# Patient Record
Sex: FEMALE | Race: ASIAN | NOT HISPANIC OR LATINO | URBAN - METROPOLITAN AREA
[De-identification: names, ages, dates, MRNs, and addresses within clinical notes are randomized per-mention and may not be internally consistent; named-entity substitution may affect disease eponyms.]

---

## 2022-04-05 ENCOUNTER — EMERGENCY (EMERGENCY)
Facility: HOSPITAL | Age: 42
LOS: 1 days | Discharge: HOME | End: 2022-04-05
Attending: EMERGENCY MEDICINE | Admitting: EMERGENCY MEDICINE
Payer: MEDICAID

## 2022-04-05 VITALS
SYSTOLIC BLOOD PRESSURE: 122 MMHG | RESPIRATION RATE: 17 BRPM | DIASTOLIC BLOOD PRESSURE: 73 MMHG | OXYGEN SATURATION: 100 % | HEART RATE: 94 BPM | TEMPERATURE: 98 F | WEIGHT: 260.15 LBS

## 2022-04-05 DIAGNOSIS — E11.9 TYPE 2 DIABETES MELLITUS WITHOUT COMPLICATIONS: ICD-10-CM

## 2022-04-05 DIAGNOSIS — E78.5 HYPERLIPIDEMIA, UNSPECIFIED: ICD-10-CM

## 2022-04-05 DIAGNOSIS — I10 ESSENTIAL (PRIMARY) HYPERTENSION: ICD-10-CM

## 2022-04-05 DIAGNOSIS — E66.01 MORBID (SEVERE) OBESITY DUE TO EXCESS CALORIES: ICD-10-CM

## 2022-04-05 LAB
A1C WITH ESTIMATED AVERAGE GLUCOSE RESULT: 11.4 % — HIGH (ref 4–5.6)
ALBUMIN SERPL ELPH-MCNC: 4 G/DL — SIGNIFICANT CHANGE UP (ref 3.3–5)
ALP SERPL-CCNC: 81 U/L — SIGNIFICANT CHANGE UP (ref 40–120)
ALT FLD-CCNC: 6 U/L — SIGNIFICANT CHANGE UP (ref 4–33)
ANION GAP SERPL CALC-SCNC: 14 MMOL/L — SIGNIFICANT CHANGE UP (ref 7–14)
ANISOCYTOSIS BLD QL: SLIGHT — SIGNIFICANT CHANGE UP
APPEARANCE UR: CLEAR — SIGNIFICANT CHANGE UP
AST SERPL-CCNC: 11 U/L — SIGNIFICANT CHANGE UP (ref 4–32)
B-OH-BUTYR SERPL-SCNC: 0.6 MMOL/L — HIGH (ref 0–0.4)
BACTERIA # UR AUTO: ABNORMAL
BASE EXCESS BLDV CALC-SCNC: -1.6 MMOL/L — SIGNIFICANT CHANGE UP (ref -2–3)
BASOPHILS # BLD AUTO: 0.05 K/UL — SIGNIFICANT CHANGE UP (ref 0–0.2)
BASOPHILS NFR BLD AUTO: 0.4 % — SIGNIFICANT CHANGE UP (ref 0–2)
BILIRUB SERPL-MCNC: 0.6 MG/DL — SIGNIFICANT CHANGE UP (ref 0.2–1.2)
BILIRUB UR-MCNC: NEGATIVE — SIGNIFICANT CHANGE UP
BLOOD GAS VENOUS COMPREHENSIVE RESULT: SIGNIFICANT CHANGE UP
BUN SERPL-MCNC: 7 MG/DL — SIGNIFICANT CHANGE UP (ref 7–23)
CALCIUM SERPL-MCNC: 9 MG/DL — SIGNIFICANT CHANGE UP (ref 8.4–10.5)
CHLORIDE BLDV-SCNC: 102 MMOL/L — SIGNIFICANT CHANGE UP (ref 96–108)
CHLORIDE SERPL-SCNC: 100 MMOL/L — SIGNIFICANT CHANGE UP (ref 98–107)
CO2 BLDV-SCNC: 26.2 MMOL/L — HIGH (ref 22–26)
CO2 SERPL-SCNC: 22 MMOL/L — SIGNIFICANT CHANGE UP (ref 22–31)
COLOR SPEC: SIGNIFICANT CHANGE UP
CREAT SERPL-MCNC: 0.52 MG/DL — SIGNIFICANT CHANGE UP (ref 0.5–1.3)
DACRYOCYTES BLD QL SMEAR: SLIGHT — SIGNIFICANT CHANGE UP
DIFF PNL FLD: ABNORMAL
EGFR: 119 ML/MIN/1.73M2 — SIGNIFICANT CHANGE UP
ELLIPTOCYTES BLD QL SMEAR: SLIGHT — SIGNIFICANT CHANGE UP
EOSINOPHIL # BLD AUTO: 0.38 K/UL — SIGNIFICANT CHANGE UP (ref 0–0.5)
EOSINOPHIL NFR BLD AUTO: 3.3 % — SIGNIFICANT CHANGE UP (ref 0–6)
EPI CELLS # UR: 4 /HPF — SIGNIFICANT CHANGE UP (ref 0–5)
ESTIMATED AVERAGE GLUCOSE: 280 — SIGNIFICANT CHANGE UP
FLUAV AG NPH QL: SIGNIFICANT CHANGE UP
FLUBV AG NPH QL: SIGNIFICANT CHANGE UP
GAS PNL BLDV: 133 MMOL/L — LOW (ref 136–145)
GLUCOSE BLDV-MCNC: 384 MG/DL — HIGH (ref 70–99)
GLUCOSE SERPL-MCNC: 359 MG/DL — HIGH (ref 70–99)
GLUCOSE UR QL: ABNORMAL
HCG SERPL-ACNC: <5 MIU/ML — SIGNIFICANT CHANGE UP
HCO3 BLDV-SCNC: 25 MMOL/L — SIGNIFICANT CHANGE UP (ref 22–29)
HCT VFR BLD CALC: 28.5 % — LOW (ref 34.5–45)
HCT VFR BLDA CALC: 23 % — LOW (ref 34.5–46.5)
HGB BLD CALC-MCNC: 7.5 G/DL — LOW (ref 11.5–15.5)
HGB BLD-MCNC: 7.4 G/DL — LOW (ref 11.5–15.5)
HYALINE CASTS # UR AUTO: 0 /LPF — SIGNIFICANT CHANGE UP (ref 0–7)
HYPOCHROMIA BLD QL: SIGNIFICANT CHANGE UP
IANC: 7.47 K/UL — HIGH (ref 1.8–7.4)
IMM GRANULOCYTES NFR BLD AUTO: 0.5 % — SIGNIFICANT CHANGE UP (ref 0–1.5)
KETONES UR-MCNC: ABNORMAL
LACTATE BLDV-MCNC: 2.1 MMOL/L — HIGH (ref 0.5–2)
LEUKOCYTE ESTERASE UR-ACNC: ABNORMAL
LYMPHOCYTES # BLD AUTO: 2.67 K/UL — SIGNIFICANT CHANGE UP (ref 1–3.3)
LYMPHOCYTES # BLD AUTO: 23.2 % — SIGNIFICANT CHANGE UP (ref 13–44)
MAGNESIUM SERPL-MCNC: 1.9 MG/DL — SIGNIFICANT CHANGE UP (ref 1.6–2.6)
MANUAL SMEAR VERIFICATION: SIGNIFICANT CHANGE UP
MCHC RBC-ENTMCNC: 15 PG — LOW (ref 27–34)
MCHC RBC-ENTMCNC: 26 GM/DL — LOW (ref 32–36)
MCV RBC AUTO: 57.7 FL — LOW (ref 80–100)
MICROCYTES BLD QL: SIGNIFICANT CHANGE UP
MONOCYTES # BLD AUTO: 0.88 K/UL — SIGNIFICANT CHANGE UP (ref 0–0.9)
MONOCYTES NFR BLD AUTO: 7.6 % — SIGNIFICANT CHANGE UP (ref 2–14)
NEUTROPHILS # BLD AUTO: 7.47 K/UL — HIGH (ref 1.8–7.4)
NEUTROPHILS NFR BLD AUTO: 65 % — SIGNIFICANT CHANGE UP (ref 43–77)
NITRITE UR-MCNC: NEGATIVE — SIGNIFICANT CHANGE UP
NRBC # BLD: 0 /100 WBCS — SIGNIFICANT CHANGE UP
NRBC # FLD: 0.02 K/UL — HIGH
PCO2 BLDV: 49 MMHG — HIGH (ref 39–42)
PH BLDV: 7.31 — LOW (ref 7.32–7.43)
PH UR: 6 — SIGNIFICANT CHANGE UP (ref 5–8)
PHOSPHATE SERPL-MCNC: 3.2 MG/DL — SIGNIFICANT CHANGE UP (ref 2.5–4.5)
PLAT MORPH BLD: NORMAL — SIGNIFICANT CHANGE UP
PLATELET # BLD AUTO: 385 K/UL — SIGNIFICANT CHANGE UP (ref 150–400)
PLATELET COUNT - ESTIMATE: NORMAL — SIGNIFICANT CHANGE UP
PO2 BLDV: 35 MMHG — SIGNIFICANT CHANGE UP
POIKILOCYTOSIS BLD QL AUTO: SIGNIFICANT CHANGE UP
POLYCHROMASIA BLD QL SMEAR: SLIGHT — SIGNIFICANT CHANGE UP
POTASSIUM BLDV-SCNC: 4.2 MMOL/L — SIGNIFICANT CHANGE UP (ref 3.5–5.1)
POTASSIUM SERPL-MCNC: 4.2 MMOL/L — SIGNIFICANT CHANGE UP (ref 3.5–5.3)
POTASSIUM SERPL-SCNC: 4.2 MMOL/L — SIGNIFICANT CHANGE UP (ref 3.5–5.3)
PROT SERPL-MCNC: 7.6 G/DL — SIGNIFICANT CHANGE UP (ref 6–8.3)
PROT UR-MCNC: ABNORMAL
RBC # BLD: 4.94 M/UL — SIGNIFICANT CHANGE UP (ref 3.8–5.2)
RBC # FLD: 24.4 % — HIGH (ref 10.3–14.5)
RBC BLD AUTO: ABNORMAL
RBC CASTS # UR COMP ASSIST: 5 /HPF — HIGH (ref 0–4)
RSV RNA NPH QL NAA+NON-PROBE: SIGNIFICANT CHANGE UP
SAO2 % BLDV: 53.3 % — SIGNIFICANT CHANGE UP
SARS-COV-2 RNA SPEC QL NAA+PROBE: SIGNIFICANT CHANGE UP
SCHISTOCYTES BLD QL AUTO: SLIGHT — SIGNIFICANT CHANGE UP
SODIUM SERPL-SCNC: 136 MMOL/L — SIGNIFICANT CHANGE UP (ref 135–145)
SP GR SPEC: 1.03 — SIGNIFICANT CHANGE UP (ref 1–1.05)
TARGETS BLD QL SMEAR: SLIGHT — SIGNIFICANT CHANGE UP
TSH SERPL-MCNC: 3.15 UIU/ML — SIGNIFICANT CHANGE UP (ref 0.27–4.2)
UROBILINOGEN FLD QL: SIGNIFICANT CHANGE UP
WBC # BLD: 11.51 K/UL — HIGH (ref 3.8–10.5)
WBC # FLD AUTO: 11.51 K/UL — HIGH (ref 3.8–10.5)
WBC UR QL: 15 /HPF — HIGH (ref 0–5)

## 2022-04-05 PROCEDURE — 99205 OFFICE O/P NEW HI 60 MIN: CPT | Mod: GC

## 2022-04-05 PROCEDURE — 99218: CPT

## 2022-04-05 RX ORDER — DEXTROSE 50 % IN WATER 50 %
12.5 SYRINGE (ML) INTRAVENOUS ONCE
Refills: 0 | Status: DISCONTINUED | OUTPATIENT
Start: 2022-04-05 | End: 2022-04-08

## 2022-04-05 RX ORDER — INSULIN LISPRO 100/ML
7 VIAL (ML) SUBCUTANEOUS
Refills: 0 | Status: DISCONTINUED | OUTPATIENT
Start: 2022-04-05 | End: 2022-04-05

## 2022-04-05 RX ORDER — CEFTRIAXONE 500 MG/1
1000 INJECTION, POWDER, FOR SOLUTION INTRAMUSCULAR; INTRAVENOUS ONCE
Refills: 0 | Status: COMPLETED | OUTPATIENT
Start: 2022-04-05 | End: 2022-04-05

## 2022-04-05 RX ORDER — DEXTROSE 50 % IN WATER 50 %
25 SYRINGE (ML) INTRAVENOUS ONCE
Refills: 0 | Status: DISCONTINUED | OUTPATIENT
Start: 2022-04-05 | End: 2022-04-08

## 2022-04-05 RX ORDER — GLUCAGON INJECTION, SOLUTION 0.5 MG/.1ML
1 INJECTION, SOLUTION SUBCUTANEOUS ONCE
Refills: 0 | Status: DISCONTINUED | OUTPATIENT
Start: 2022-04-05 | End: 2022-04-08

## 2022-04-05 RX ORDER — SODIUM CHLORIDE 9 MG/ML
1000 INJECTION INTRAMUSCULAR; INTRAVENOUS; SUBCUTANEOUS ONCE
Refills: 0 | Status: COMPLETED | OUTPATIENT
Start: 2022-04-05 | End: 2022-04-05

## 2022-04-05 RX ORDER — SODIUM CHLORIDE 9 MG/ML
1000 INJECTION, SOLUTION INTRAVENOUS
Refills: 0 | Status: DISCONTINUED | OUTPATIENT
Start: 2022-04-05 | End: 2022-04-08

## 2022-04-05 RX ORDER — DEXTROSE 50 % IN WATER 50 %
15 SYRINGE (ML) INTRAVENOUS ONCE
Refills: 0 | Status: DISCONTINUED | OUTPATIENT
Start: 2022-04-05 | End: 2022-04-08

## 2022-04-05 RX ORDER — INSULIN LISPRO 100/ML
VIAL (ML) SUBCUTANEOUS
Refills: 0 | Status: DISCONTINUED | OUTPATIENT
Start: 2022-04-05 | End: 2022-04-08

## 2022-04-05 RX ORDER — SODIUM CHLORIDE 9 MG/ML
1000 INJECTION, SOLUTION INTRAVENOUS ONCE
Refills: 0 | Status: DISCONTINUED | OUTPATIENT
Start: 2022-04-05 | End: 2022-04-05

## 2022-04-05 RX ORDER — INSULIN LISPRO 100/ML
VIAL (ML) SUBCUTANEOUS AT BEDTIME
Refills: 0 | Status: DISCONTINUED | OUTPATIENT
Start: 2022-04-05 | End: 2022-04-08

## 2022-04-05 RX ORDER — INSULIN GLARGINE 100 [IU]/ML
24 INJECTION, SOLUTION SUBCUTANEOUS AT BEDTIME
Refills: 0 | Status: DISCONTINUED | OUTPATIENT
Start: 2022-04-05 | End: 2022-04-08

## 2022-04-05 RX ORDER — INSULIN LISPRO 100/ML
8 VIAL (ML) SUBCUTANEOUS
Refills: 0 | Status: DISCONTINUED | OUTPATIENT
Start: 2022-04-05 | End: 2022-04-08

## 2022-04-05 RX ORDER — INSULIN GLARGINE 100 [IU]/ML
23 INJECTION, SOLUTION SUBCUTANEOUS AT BEDTIME
Refills: 0 | Status: DISCONTINUED | OUTPATIENT
Start: 2022-04-05 | End: 2022-04-05

## 2022-04-05 RX ADMIN — SODIUM CHLORIDE 125 MILLILITER(S): 9 INJECTION, SOLUTION INTRAVENOUS at 18:34

## 2022-04-05 RX ADMIN — CEFTRIAXONE 100 MILLIGRAM(S): 500 INJECTION, POWDER, FOR SOLUTION INTRAMUSCULAR; INTRAVENOUS at 14:47

## 2022-04-05 RX ADMIN — SODIUM CHLORIDE 1000 MILLILITER(S): 9 INJECTION INTRAMUSCULAR; INTRAVENOUS; SUBCUTANEOUS at 14:47

## 2022-04-05 RX ADMIN — INSULIN GLARGINE 24 UNIT(S): 100 INJECTION, SOLUTION SUBCUTANEOUS at 22:00

## 2022-04-05 RX ADMIN — Medication 2: at 17:35

## 2022-04-05 RX ADMIN — Medication 1: at 22:00

## 2022-04-05 RX ADMIN — Medication 8 UNIT(S): at 17:35

## 2022-04-05 RX ADMIN — SODIUM CHLORIDE 1000 MILLILITER(S): 9 INJECTION INTRAMUSCULAR; INTRAVENOUS; SUBCUTANEOUS at 12:19

## 2022-04-05 NOTE — ED PROVIDER NOTE - PROGRESS NOTE DETAILS
pt states she is not bleeding now, hx of low hemoglobin from DUB.  recommend CDU for endo pt willing to stay. obtain a weight for insulin sliding scale

## 2022-04-05 NOTE — ED CDU PROVIDER INITIAL DAY NOTE - PROGRESS NOTE DETAILS
cdu scot matt: pt resting comfortably in bed, getting insulin, fluids, will monitor FS's, and reasses

## 2022-04-05 NOTE — ED CDU PROVIDER INITIAL DAY NOTE - ATTENDING CONTRIBUTION TO CARE
Attending Statement: I have reviewed and agree with all pertinent clinical information, including history and physical exam and agree with treatment plan of the PA, except as noted.  42yoF denies sig pmhx pw lethargy, fatigue and dizziness x one week. no fever/chills. no headache no focal weakness or numbness no cp./sob no n/v/d no abdominal pain. no trauma. no hx of DM   Vital signs noted. obese female sitting up, nontoxic. EOMI no facial asymmetry no slurred speech normal S1-S2 No resp distress. able to speak in full and clear sentences. no wheeze, rales or stridor. AOx3, no focal deficits. CN 2-12 grossly intact. nl gait. no meningeal signs.   plan endocrine consult, labs, and monitor

## 2022-04-05 NOTE — CONSULT NOTE ADULT - ASSESSMENT
41 y/o F w/ hx of anemia (on daily iron tablets - never required transfusion) p/w 1 week of dizziness ( described as the room spinning), nausea, increased thirst, lethargy and urinary frequency, found to have new onset T2DM    #New onset T2DM  A1c 11.4%. Goal < 7%  Inpatient FS goal 140-180  Home regimen: None (new onset)  Recs:   -Lantus _  qHS  -Admelog _ qAC. HOLD if NPO  -low dose correctional scale qAC and qHS  -consistent carb diet  -FS qAC and qHS  -RD consult and insulin pen teaching (ordered)   For d/c:   -basal/bolus vs. basal + orals  -Can fu with     #HTN  BP Goal < 130/80  Continue management per primary team    #HLD  LDL goal < 70  Statin if no contraindications    Libby Soliz MD  Endocrine Fellow  Can be reached via teams. For follow up questions, discharge recommendations, or new consults, please call answering service at [phone] (weekdays); [phone] (nights/weekends) 41 y/o F w/ hx of anemia (on daily iron tablets - never required transfusion) p/w 1 week of dizziness ( described as the room spinning), nausea, increased thirst, lethargy and urinary frequency, found to have new onset T2DM    #New onset T2DM  A1c 11.4%. Goal < 7%  Inpatient FS goal 140-180  Home regimen: None (new onset)  BHB 0.6, mild. No gap or acidosis  Start 0.4 u/kg weight based dosing  Recs:   -Start Lantus 24 units qHS  -Start Admelog 8 units qAC. HOLD if NPO  -low dose correctional scale qAC and qHS  -consistent carb diet  -FS qAC and qHS  -RD consult and insulin pen teaching (ordered)   -Discussed that patient should avoid pregnancy with uncontrolled A1c  For d/c:   -Lantus (likely 24 units) + Metformin 500 mg BID with meals (inc to 1000 mg BID with meals 1 week from discharge)  -For coverage purpose, please send Lantus/Basaglar/Tresiba/Semglee/Toujeo 10 units at bedtime. Can start by sending one of each and discuss with pharmacy which is covered.   -Please d/c with supplies: insulin pens, pen needles, glucometer, test strips, lancets, and alcohol pads   -Can fu with Endocrine Practice at [address] [place] [address], [address], [place] [address]; Ph # [phone]  -Will need ophthalmology follow up.     #HTN  BP Goal < 130/80  Continue management per primary team    #HLD  LDL goal < 70  Patient is of child bearing age. Can check lipid panel outpatient and decide on statin initiation.     Libby Soliz MD  Endocrine Fellow  Can be reached via teams. For follow up questions, discharge recommendations, or new consults, please call answering service at [phone] (weekdays); [phone] (nights/weekends)

## 2022-04-05 NOTE — ED CDU PROVIDER INITIAL DAY NOTE - OBJECTIVE STATEMENT
43 y/o female pmh obesity c/o dizziness, weakness, polyuria and polydipsia x 1 week. Pt admits to not feeling well this week and it became worse today. Pt was found to have FS of 367. Labs revealed a1c of 11.4 and pt was sent to the CDU for glycemic control and endocrine consult. pt denies chest pain, sob, abd pain, v/d, numbness, tingling, syncope, fever or chills.

## 2022-04-05 NOTE — ED PROVIDER NOTE - CHIEF COMPLAINT
Problem: RESPIRATORY - ADULT  Goal: Achieves optimal ventilation and oxygenation  INTERVENTIONS:  - Assess for changes in respiratory status  - Assess for changes in mentation and behavior  - Position to facilitate oxygenation and minimize respiratory effo The patient is a 42y Female complaining of

## 2022-04-05 NOTE — ED PROVIDER NOTE - NS ED ROS FT
CONSTITUTIONAL - No fever, No diaphoresis, No weight change  EYES - No eye pain, No blurred vision  ENT - No change in hearing, No sore throat, No neck pain, No rhinorrhea, No ear pain  RESPIRATORY - No shortness of breath, No cough  CARDIAC -No chest pain, No palpitations  GI - No abdominal pain, +nausea, No vomiting, No diarrhea, No constipation  - No dysuria, +frequency, no hematuria.   MUSCULOSKELETAL - No joint pain, No swelling, No back pain  NEUROLOGIC - No numbness, +gen lethargy, No headache, +dizziness

## 2022-04-05 NOTE — CONSULT NOTE ADULT - SUBJECTIVE AND OBJECTIVE BOX
HPI:  41 y/o F w/ hx of anemia (on daily iron tablets - never required transfusion) p/w 1 week of dizziness ( described as the room spinning), nausea, increased thirst, lethargy and urinary frequency.    A1c found to be 11.4%. . BHB mildly positive 0.6, with AG 14 and Bicarb 22. Consulted for new onset DM.     PAST MEDICAL & SURGICAL HISTORY:    FAMILY HISTORY:    Social History:    Home Medications:      MEDICATIONS  (STANDING):  dextrose 5%. 1000 milliLiter(s) (50 mL/Hr) IV Continuous <Continuous>  dextrose 5%. 1000 milliLiter(s) (100 mL/Hr) IV Continuous <Continuous>  dextrose 50% Injectable 25 Gram(s) IV Push once  dextrose 50% Injectable 12.5 Gram(s) IV Push once  dextrose 50% Injectable 25 Gram(s) IV Push once  glucagon  Injectable 1 milliGRAM(s) IntraMuscular once  insulin glargine Injectable (LANTUS) 23 Unit(s) SubCutaneous at bedtime  insulin lispro (ADMELOG) corrective regimen sliding scale   SubCutaneous three times a day before meals  insulin lispro (ADMELOG) corrective regimen sliding scale   SubCutaneous at bedtime  insulin lispro Injectable (ADMELOG) 7 Unit(s) SubCutaneous three times a day before meals  lactated ringers. 1000 milliLiter(s) (125 mL/Hr) IV Continuous <Continuous>    MEDICATIONS  (PRN):  dextrose Oral Gel 15 Gram(s) Oral once PRN Blood Glucose LESS THAN 70 milliGRAM(s)/deciliter      Allergies    No Known Allergies    Intolerances      Review of Systems:  Constitutional: No fever  Eyes: No blurry vision  Neuro: No tremors  HEENT: No pain  Cardiovascular: No chest pain, palpitations  Respiratory: No SOB, no cough  GI: No nausea, vomiting, abdominal pain  : No dysuria  Skin: no rash  Psych: no depression  Endocrine: no polyuria, polydipsia  Hem/lymph: no swelling  Osteoporosis: no fractures    PHYSICAL EXAM:  VITALS: T(C): 36.8 (04-05-22 @ 15:36)  T(F): 98.2 (04-05-22 @ 15:36), Max: 98.3 (04-05-22 @ 11:01)  HR: 86 (04-05-22 @ 15:36) (86 - 94)  BP: 127/81 (04-05-22 @ 15:36) (114/96 - 132/70)  RR:  (17 - 20)  SpO2:  (100% - 100%)  Wt(kg): --  GENERAL: NAD  EYES: No proptosis, no lid lag, anicteric  THYROID: Normal size, no palpable nodules  RESPIRATORY: Clear to auscultation bilaterally  CARDIOVASCULAR: Regular rate and rhythm  GI: Soft, nontender, non distended  EXT: b/l feet without wounds; 2+ pulses  PSYCH: Alert and oriented x 3, reactive mood    POCT Blood Glucose.: 311 mg/dL (04-05-22 @ 13:43)  POCT Blood Glucose.: 314 mg/dL (04-05-22 @ 12:37)  POCT Blood Glucose.: 367 mg/dL (04-05-22 @ 11:03)                            7.4    11.51 )-----------( 385      ( 05 Apr 2022 12:30 )             28.5       04-05    136  |  100  |  7   ----------------------------<  359<H>  4.2   |  22  |  0.52    EGFR if : x   EGFR if non : x     Ca    9.0      04-05  Mg     1.90     04-05  Phos  3.2     04-05    TPro  7.6  /  Alb  4.0  /  TBili  0.6  /  DBili  x   /  AST  11  /  ALT  6   /  AlkPhos  81  04-05      Thyroid Function Tests:  04-05 @ 12:30 TSH 3.15 FreeT4 -- T3 -- Anti TPO -- Anti Thyroglobulin Ab -- TSI --      A1C with Estimated Average Glucose Result: 11.4 % (04-05-22 @ 12:30)          Radiology:                HPI:  43 y/o F w/ hx of anemia (on daily iron tablets - never required transfusion) p/w 1 week of dizziness ( described as the room spinning), nausea, increased thirst, lethargy and urinary frequency.    A1c found to be 11.4%. . BHB mildly positive 0.6, with AG 14 and Bicarb 22. Consulted for new onset DM.   Patient does not take any medications at home. Diet is poor.     PAST MEDICAL & SURGICAL HISTORY: None    FAMILY HISTORY: DM in mother    Social History: No tobacco or alcohol use    Home Medications: iron tablets, ibuprofen for menstrual cramps      MEDICATIONS  (STANDING):  dextrose 5%. 1000 milliLiter(s) (50 mL/Hr) IV Continuous <Continuous>  dextrose 5%. 1000 milliLiter(s) (100 mL/Hr) IV Continuous <Continuous>  dextrose 50% Injectable 25 Gram(s) IV Push once  dextrose 50% Injectable 12.5 Gram(s) IV Push once  dextrose 50% Injectable 25 Gram(s) IV Push once  glucagon  Injectable 1 milliGRAM(s) IntraMuscular once  insulin glargine Injectable (LANTUS) 23 Unit(s) SubCutaneous at bedtime  insulin lispro (ADMELOG) corrective regimen sliding scale   SubCutaneous three times a day before meals  insulin lispro (ADMELOG) corrective regimen sliding scale   SubCutaneous at bedtime  insulin lispro Injectable (ADMELOG) 7 Unit(s) SubCutaneous three times a day before meals  lactated ringers. 1000 milliLiter(s) (125 mL/Hr) IV Continuous <Continuous>    MEDICATIONS  (PRN):  dextrose Oral Gel 15 Gram(s) Oral once PRN Blood Glucose LESS THAN 70 milliGRAM(s)/deciliter      Allergies    No Known Allergies    Intolerances      Review of Systems:  Constitutional: No fever  Eyes: No blurry vision  Neuro: No tremors  HEENT: No pain  Cardiovascular: No chest pain, palpitations  Respiratory: No SOB, no cough  GI: No nausea, vomiting, abdominal pain  : No dysuria  Skin: no rash  Psych: no depression  Endocrine: no polyuria, polydipsia  Hem/lymph: no swelling  Osteoporosis: no fractures    PHYSICAL EXAM:  VITALS: T(C): 36.8 (04-05-22 @ 15:36)  T(F): 98.2 (04-05-22 @ 15:36), Max: 98.3 (04-05-22 @ 11:01)  HR: 86 (04-05-22 @ 15:36) (86 - 94)  BP: 127/81 (04-05-22 @ 15:36) (114/96 - 132/70)  RR:  (17 - 20)  SpO2:  (100% - 100%)  Wt(kg): --  GENERAL: NAD, obese  EYES: No proptosis, no lid lag, anicteric  THYROID: Normal size, no palpable nodules  RESPIRATORY: Clear to auscultation bilaterally  CARDIOVASCULAR: Regular rate and rhythm  GI: Soft, nontender, non distended  EXT: b/l feet without wounds; 2+ pulses  PSYCH: Alert and oriented x 3, reactive mood    POCT Blood Glucose.: 311 mg/dL (04-05-22 @ 13:43)  POCT Blood Glucose.: 314 mg/dL (04-05-22 @ 12:37)  POCT Blood Glucose.: 367 mg/dL (04-05-22 @ 11:03)                            7.4    11.51 )-----------( 385      ( 05 Apr 2022 12:30 )             28.5       04-05    136  |  100  |  7   ----------------------------<  359<H>  4.2   |  22  |  0.52    EGFR if : x   EGFR if non : x     Ca    9.0      04-05  Mg     1.90     04-05  Phos  3.2     04-05    TPro  7.6  /  Alb  4.0  /  TBili  0.6  /  DBili  x   /  AST  11  /  ALT  6   /  AlkPhos  81  04-05      Thyroid Function Tests:  04-05 @ 12:30 TSH 3.15 FreeT4 -- T3 -- Anti TPO -- Anti Thyroglobulin Ab -- TSI --      A1C with Estimated Average Glucose Result: 11.4 % (04-05-22 @ 12:30)          Radiology:

## 2022-04-05 NOTE — CONSULT NOTE ADULT - ATTENDING COMMENTS
42F new onset DM2 HbA1c 11.4%  Patient used to go to gym and eat healthier, will resume these habits.  DC on basal insulin plus metformin. Insulin pen and glucometer teaching.  Discussed adding GLP1RA as outpatient. Working on setting appointments at [address] [place] [address] for follow up.  Endocrine team to finalize dosing for dc recs tomorrow.    Jose Galarza MD  Division of Endocrinology  Pager: [phone]    If after 6PM or before 9AM, or on weekends/holidays, please call endocrine answering service for assistance ([phone]).  For nonurgent matters email [email or web address] for assistance.

## 2022-04-05 NOTE — ED ADULT TRIAGE NOTE - CHIEF COMPLAINT QUOTE
pt c/o dizziness, increased thirst, lethargy and nausea x 1 week. pt found to be hyperglycemic in triage, denies PMH of DM.

## 2022-04-05 NOTE — ED ADULT NURSE NOTE - OBJECTIVE STATEMENT
pt A&ox4, came to ED for polyuria, dizziness, and increased thirst. pt states symptoms started 1 week ago. pt feels dizzy when walking and resting. pt denies difficulty urinating and states urine is clear.  pt denies Chest pain and SOB. NSR on telemetry. breathing is spontaneous and unlabored. sating 99% on RA. bilateral pedal and radial pulses palpable and strong. left 20g IV placed Labs drawn and sent as per ordered. Bed in lowest position, call bell within reach, all other safety and comfort measures provided. awaiting labs results and further orders.

## 2022-04-05 NOTE — ED PROVIDER NOTE - CLINICAL SUMMARY MEDICAL DECISION MAKING FREE TEXT BOX
O'Valery DO PGY-2: pt p/w 1 week of nausea, lethargy, urinary frequency increased thirst. DDx include but not limited to: new DM w/ hyperglycemia vs DKA vs UTI vs electrolyte abnormality. Ordered labs, UA/uclx, ekg, fluids. Will reassess. Dispo pending workup.

## 2022-04-05 NOTE — ED PROVIDER NOTE - ATTENDING CONTRIBUTION TO CARE
Attending Statement: I have personally seen and examined this patient. I have fully participated in the care of this patient. I have reviewed all pertinent clinical information, including history physical exam, plan and the Resident's note and agree except as noted  42yoF denies sig pmhx pw lethargy, fatigue and dizziness x one week. no fever/chills. no headache no focal weakness or numbness no cp./sob no n/v/d no abdominal pain. no trauma. no hx of DM   Vital signs noted. obese female sitting up, nontoxic. EOMI no facial asymmetry no slurred speech normal S1-S2 No resp distress. able to speak in full and clear sentences. no wheeze, rales or stridor. AOx3, no focal deficits. CN 2-12 grossly intact. nl gait. no meningeal signs.   plan ekg, labs, IVF, re assess

## 2022-04-05 NOTE — ED PROVIDER NOTE - PHYSICAL EXAMINATION
CONSTITUTIONAL: Well-developed; well-nourished; in no acute distress.   SKIN: warm, dry  HEAD: Normocephalic; atraumatic.  EYES: no conjunctival injection. PERRL.   ENT: No nasal discharge; airway clear. Mucus membranes dry   NECK: Supple; non tender.  CARD: S1, S2 normal; no murmurs, gallops, or rubs. Regular rate and rhythm.   RESP: No wheezes, rales or rhonchi. Good air movement bilaterally.   ABD: soft ntnd, no guarding, no distention, no rigidity.   EXT:  No cyanosis.   NEURO: Alert, oriented, grossly unremarkable. CN III-XII intact. Motor strength 5/5 in all extremities. Sensation intact throughout.   PSYCH: Cooperative, appropriate.

## 2022-04-05 NOTE — ED PROVIDER NOTE - OBJECTIVE STATEMENT
41 y/o F w/ no significant pmhx p/w 1 week of dizziness ( described as the room spinning), nausea, increased thirst, lethargy and urinary frequency. Denies recent f/c, vomiting, cp, sob, abd pain, constipation. 43 y/o F w/ hx of anemia (on daily iron tablets - never required transfusion) p/w 1 week of dizziness ( described as the room spinning), nausea, increased thirst, lethargy and urinary frequency. Denies recent f/c, vomiting, cp, sob, abd pain, constipation.

## 2022-04-06 VITALS
DIASTOLIC BLOOD PRESSURE: 76 MMHG | RESPIRATION RATE: 18 BRPM | HEART RATE: 90 BPM | SYSTOLIC BLOOD PRESSURE: 144 MMHG | TEMPERATURE: 98 F | OXYGEN SATURATION: 99 %

## 2022-04-06 LAB
ALBUMIN SERPL ELPH-MCNC: 3.4 G/DL — SIGNIFICANT CHANGE UP (ref 3.3–5)
ALP SERPL-CCNC: 71 U/L — SIGNIFICANT CHANGE UP (ref 40–120)
ALT FLD-CCNC: 5 U/L — SIGNIFICANT CHANGE UP (ref 4–33)
ANION GAP SERPL CALC-SCNC: 10 MMOL/L — SIGNIFICANT CHANGE UP (ref 7–14)
AST SERPL-CCNC: 9 U/L — SIGNIFICANT CHANGE UP (ref 4–32)
BASOPHILS # BLD AUTO: 0.04 K/UL — SIGNIFICANT CHANGE UP (ref 0–0.2)
BASOPHILS NFR BLD AUTO: 0.3 % — SIGNIFICANT CHANGE UP (ref 0–2)
BILIRUB SERPL-MCNC: 0.5 MG/DL — SIGNIFICANT CHANGE UP (ref 0.2–1.2)
BLOOD GAS VENOUS COMPREHENSIVE RESULT: SIGNIFICANT CHANGE UP
BUN SERPL-MCNC: 7 MG/DL — SIGNIFICANT CHANGE UP (ref 7–23)
CALCIUM SERPL-MCNC: 8.4 MG/DL — SIGNIFICANT CHANGE UP (ref 8.4–10.5)
CHLORIDE SERPL-SCNC: 101 MMOL/L — SIGNIFICANT CHANGE UP (ref 98–107)
CO2 SERPL-SCNC: 23 MMOL/L — SIGNIFICANT CHANGE UP (ref 22–31)
CREAT SERPL-MCNC: 0.49 MG/DL — LOW (ref 0.5–1.3)
CULTURE RESULTS: SIGNIFICANT CHANGE UP
EGFR: 121 ML/MIN/1.73M2 — SIGNIFICANT CHANGE UP
EOSINOPHIL # BLD AUTO: 0.45 K/UL — SIGNIFICANT CHANGE UP (ref 0–0.5)
EOSINOPHIL NFR BLD AUTO: 3.5 % — SIGNIFICANT CHANGE UP (ref 0–6)
GLUCOSE SERPL-MCNC: 259 MG/DL — HIGH (ref 70–99)
HCT VFR BLD CALC: 28 % — LOW (ref 34.5–45)
HGB BLD-MCNC: 7.1 G/DL — LOW (ref 11.5–15.5)
IANC: 8.79 K/UL — HIGH (ref 1.8–7.4)
IMM GRANULOCYTES NFR BLD AUTO: 0.3 % — SIGNIFICANT CHANGE UP (ref 0–1.5)
LYMPHOCYTES # BLD AUTO: 2.61 K/UL — SIGNIFICANT CHANGE UP (ref 1–3.3)
LYMPHOCYTES # BLD AUTO: 20.4 % — SIGNIFICANT CHANGE UP (ref 13–44)
MCHC RBC-ENTMCNC: 14.8 PG — LOW (ref 27–34)
MCHC RBC-ENTMCNC: 25.4 GM/DL — LOW (ref 32–36)
MCV RBC AUTO: 58.3 FL — LOW (ref 80–100)
MONOCYTES # BLD AUTO: 0.84 K/UL — SIGNIFICANT CHANGE UP (ref 0–0.9)
MONOCYTES NFR BLD AUTO: 6.6 % — SIGNIFICANT CHANGE UP (ref 2–14)
NEUTROPHILS # BLD AUTO: 8.79 K/UL — HIGH (ref 1.8–7.4)
NEUTROPHILS NFR BLD AUTO: 68.9 % — SIGNIFICANT CHANGE UP (ref 43–77)
NRBC # BLD: 0 /100 WBCS — SIGNIFICANT CHANGE UP
NRBC # FLD: 0.03 K/UL — HIGH
PLATELET # BLD AUTO: 363 K/UL — SIGNIFICANT CHANGE UP (ref 150–400)
POTASSIUM SERPL-MCNC: 3.9 MMOL/L — SIGNIFICANT CHANGE UP (ref 3.5–5.3)
POTASSIUM SERPL-SCNC: 3.9 MMOL/L — SIGNIFICANT CHANGE UP (ref 3.5–5.3)
PROT SERPL-MCNC: 6.6 G/DL — SIGNIFICANT CHANGE UP (ref 6–8.3)
RBC # BLD: 4.8 M/UL — SIGNIFICANT CHANGE UP (ref 3.8–5.2)
RBC # FLD: 24.5 % — HIGH (ref 10.3–14.5)
SODIUM SERPL-SCNC: 134 MMOL/L — LOW (ref 135–145)
SPECIMEN SOURCE: SIGNIFICANT CHANGE UP
WBC # BLD: 12.77 K/UL — HIGH (ref 3.8–10.5)
WBC # FLD AUTO: 12.77 K/UL — HIGH (ref 3.8–10.5)

## 2022-04-06 PROCEDURE — 99217: CPT | Mod: FS

## 2022-04-06 PROCEDURE — 99214 OFFICE O/P EST MOD 30 MIN: CPT

## 2022-04-06 RX ORDER — ISOPROPYL ALCOHOL, BENZOCAINE .7; .06 ML/ML; ML/ML
1 SWAB TOPICAL
Qty: 100 | Refills: 1
Start: 2022-04-06 | End: 2022-05-25

## 2022-04-06 RX ORDER — CEPHALEXIN 500 MG
1 CAPSULE ORAL
Qty: 28 | Refills: 0
Start: 2022-04-06 | End: 2022-04-12

## 2022-04-06 RX ORDER — ENOXAPARIN SODIUM 100 MG/ML
28 INJECTION SUBCUTANEOUS
Qty: 5 | Refills: 0
Start: 2022-04-06 | End: 2022-05-05

## 2022-04-06 RX ORDER — METFORMIN HYDROCHLORIDE 850 MG/1
1 TABLET ORAL
Qty: 100 | Refills: 0
Start: 2022-04-06 | End: 2022-05-25

## 2022-04-06 RX ORDER — INSULIN LISPRO 100/ML
10 VIAL (ML) SUBCUTANEOUS
Qty: 5 | Refills: 0
Start: 2022-04-06 | End: 2022-05-05

## 2022-04-06 RX ADMIN — Medication 3: at 07:27

## 2022-04-06 RX ADMIN — Medication 3: at 16:49

## 2022-04-06 RX ADMIN — Medication 8 UNIT(S): at 07:28

## 2022-04-06 RX ADMIN — Medication 8 UNIT(S): at 16:49

## 2022-04-06 RX ADMIN — Medication 5: at 12:13

## 2022-04-06 RX ADMIN — Medication 8 UNIT(S): at 12:14

## 2022-04-06 NOTE — ED CDU PROVIDER SUBSEQUENT DAY NOTE - NS ED ATTENDING STATEMENT MOD
This was a shared visit with the NARESH. I reviewed and verified the documentation and independently performed the documented:

## 2022-04-06 NOTE — ED CDU PROVIDER DISPOSITION NOTE - NS ED ATTENDING STATEMENT MOD
This was a shared visit with the NARESH. I reviewed and verified the documentation and independently performed the documented: Attending with

## 2022-04-06 NOTE — PROGRESS NOTE ADULT - SUBJECTIVE AND OBJECTIVE BOX
Chief Complaint/Follow-up on:   DM    Subjective:  pt feels well  we had a long discussion regarding her DM and need for DM control   her glucose remains elevated despite basal bolus insulin         MEDICATIONS  (STANDING):  dextrose 5%. 1000 milliLiter(s) (50 mL/Hr) IV Continuous <Continuous>  dextrose 5%. 1000 milliLiter(s) (100 mL/Hr) IV Continuous <Continuous>  dextrose 50% Injectable 25 Gram(s) IV Push once  dextrose 50% Injectable 12.5 Gram(s) IV Push once  dextrose 50% Injectable 25 Gram(s) IV Push once  glucagon  Injectable 1 milliGRAM(s) IntraMuscular once  insulin glargine Injectable (LANTUS) 24 Unit(s) SubCutaneous at bedtime  insulin lispro (ADMELOG) corrective regimen sliding scale   SubCutaneous three times a day before meals  insulin lispro (ADMELOG) corrective regimen sliding scale   SubCutaneous at bedtime  insulin lispro Injectable (ADMELOG) 8 Unit(s) SubCutaneous three times a day before meals  lactated ringers. 1000 milliLiter(s) (125 mL/Hr) IV Continuous <Continuous>    MEDICATIONS  (PRN):  dextrose Oral Gel 15 Gram(s) Oral once PRN Blood Glucose LESS THAN 70 milliGRAM(s)/deciliter      PHYSICAL EXAM:  VITALS: T(C): 37 (04-06-22 @ 13:58)  T(F): 98.6 (04-06-22 @ 13:58), Max: 98.7 (04-06-22 @ 02:05)  HR: 91 (04-06-22 @ 13:58) (78 - 92)  BP: 143/81 (04-06-22 @ 13:58) (119/69 - 143/81)  RR:  (17 - 20)  SpO2:  (97% - 100%)  Wt(kg): --  GENERAL: NAD, well-groomed, well-developed  RESPIRATORY: Clear to auscultation bilaterally; No rales, rhonchi, wheezing, or rubs  CARDIOVASCULAR: Regular rate and rhythm; No murmurs; no peripheral edema  GI: Soft, nontender, non distended, normal bowel sounds      POCT Blood Glucose.: 274 mg/dL (04-06-22 @ 16:38)  POCT Blood Glucose.: 367 mg/dL (04-06-22 @ 14:01)  POCT Blood Glucose.: 354 mg/dL (04-06-22 @ 11:55)  POCT Blood Glucose.: 281 mg/dL (04-06-22 @ 07:25)  POCT Blood Glucose.: 284 mg/dL (04-05-22 @ 21:29)  POCT Blood Glucose.: 244 mg/dL (04-05-22 @ 17:26)  POCT Blood Glucose.: 311 mg/dL (04-05-22 @ 13:43)  POCT Blood Glucose.: 314 mg/dL (04-05-22 @ 12:37)  POCT Blood Glucose.: 367 mg/dL (04-05-22 @ 11:03)    04-06    134<L>  |  101  |  7   ----------------------------<  259<H>  3.9   |  23  |  0.49<L>    EGFR if : x   EGFR if non : x     Ca    8.4      04-06  Mg     1.90     04-05  Phos  3.2     04-05    TPro  6.6  /  Alb  3.4  /  TBili  0.5  /  DBili  x   /  AST  9   /  ALT  5   /  AlkPhos  71  04-06          Thyroid Function Tests:  04-05 @ 12:30 TSH 3.15 FreeT4 -- T3 -- Anti TPO -- Anti Thyroglobulin Ab -- TSI --

## 2022-04-06 NOTE — ED CDU PROVIDER DISPOSITION NOTE - CLINICAL COURSE
41 y/o female pmh obesity c/o dizziness, weakness, polyuria and polydipsia x 1 week. Pt admits to not feeling well this week and it became worse today. Pt was found to have FS of 367. Labs revealed a1c of 11.4 and pt was sent to the CDU for glycemic control and endocrine consult. pt denies chest pain, sob, abd pain, v/d, numbness, tingling, syncope, fever or chills. Endocrine recommending Lantus 28 units at bedtime, metformin 500mg twice daily for 1 week then to increase to 1000mg twice daily. Rx also sent for Keflex for UTI. Pt clinically stable, vitals within normal, no complaints at present. Pt will return with any worsening or concerning symptoms. 41 y/o female pmh obesity c/o dizziness, weakness, polyuria and polydipsia x 1 week. Pt admits to not feeling well this week and it became worse today. Pt was found to have FS of 367. Labs revealed a1c of 11.4 and pt was sent to the CDU for glycemic control and endocrine consult. pt denies chest pain, sob, abd pain, v/d, numbness, tingling, syncope, fever or chills. Endocrine recommending Lantus 28 units at bedtime, admelog 10 units TID with meals, metformin 500mg twice daily for 1 week then to increase to 1000mg twice daily. Rx also sent for Keflex for UTI. Pt clinically stable, vitals within normal, no complaints at present. Pt will return with any worsening or concerning symptoms.

## 2022-04-06 NOTE — ED CDU PROVIDER SUBSEQUENT DAY NOTE - SEVERE SEPSIS ALERT DETAILS
KHRIS Koehler: Spoke with endocrine attg , recommending increasing Lantus to 28 units for discharge with metformin 500mg BID )can increase to 1000BID in 1 week). Spoke with pts PMD  ( [phone]) pt can follow up with her. Pt gregory No bruits; no thyromegaly or nodules

## 2022-04-06 NOTE — ED CDU PROVIDER SUBSEQUENT DAY NOTE - HISTORY
41 y/o female pmh obesity c/o dizziness, weakness, polyuria and polydipsia x 1 week. Pt admits to not feeling well this week and it became worse today. Pt was found to have FS of 367. Labs revealed a1c of 11.4 and pt was sent to the CDU for glycemic control and endocrine consult. pt denies chest pain, sob, abd pain, v/d, numbness, tingling, syncope, fever or chills.

## 2022-04-06 NOTE — ED CDU PROVIDER DISPOSITION NOTE - PATIENT PORTAL LINK FT
You can access the FollowMyHealth Patient Portal offered by [facility] by registering at the following website: [email or web address]. By joining [employer]’s FollowMyHealth portal, you will also be able to view your health information using other applications (apps) compatible with our system. You can access the FollowMyHealth Patient Portal offered by  by registering at the following website: [email or web address]. By joining [employer]’s FollowMyHealth portal, you will also be able to view your health information using other applications (apps) compatible with our system.

## 2022-04-06 NOTE — PROGRESS NOTE ADULT - ASSESSMENT
41 y/o F w/ hx of anemia (on daily iron tablets - never required transfusion) p/w 1 week of dizziness ( described as the room spinning), nausea, increased thirst, lethargy and urinary frequency, found to have new onset T2DM    #New onset T2DM  A1c 11.4%. Goal < 7%  Inpatient FS goal 140-180  Home regimen: None (new onset)  Recs:   -increase Lantus 28 units qHS  -increase Admelog 10 units qAC. HOLD if NPO  -low dose correctional scale qAC and qHS  -consistent carb diet  -FS qAC and qHS  -RD consult and insulin pen teaching (ordered)   -Discussed that patient should avoid pregnancy with uncontrolled A1c    To prepare for dc:  -Discussed with patient the importance of Carb consistent diet and exercise as tolerated.    -Recommend nutritional consultation  inpt prior to dc   -Discussed glycemic goal of a1c <7% to prevent microvascular complications of diabetes mellitus and reduce the risk of macrovascular complications.  - Make sure patient knows how to inject insulin and check fingersticks with glucometer (ask bedside RN for teaching)  - Discharge on premeal insulin and Lantus. do not dc on correction scale or bedtime scale.  - At home, Patient should check FSBG premeals and bedtime. Pt should call their doctor when FSBG <70 or above >400 and or consistently above 200s as changes in the regimen will have to be made.  -pt should call PMD for DM related questions or concerns until pt is seen by CDE or endocrine   -Recommend annual podiatry and ophthalmology follow up.     -------------------------------------------------------------------------------------------------------------------------------------  DISCHARGE RX:  - Glucometer (ACCU_CHECK tricia Conenct, Ascensia Contour Next EZ or One, Freestyle Freedome LITE or OneTouch Verio IQ)  - Glucometer test strips and lancets  (make sure compatible w glucometer), Dispense #100 (or #200) use as directed  -  BD ajit 4mm pen needles  Please send Lantus solsotar pen and humalog kwikpen as test script to check insurance coverage.  Ok to send with current doses and update prior to d/c    Lantus Solostar Pen ___ units before bedtime, dispense 15ml  - if not covered- can try alternating with one of following   tresiba/basaglar/toujeo/Levemir    Humalog Flexpen up to ___ dispense 15ml- can try alternating with one of following  if not covered try alternative: novolog/apidra/admelog/fiasp    Patient will also need a glucometer, test strips, lancets, alcohol pads,     --------------------------------------------------------------------------------------------------------------------------------------      For d/c:   -Lantus 28 units and Humalog 10 units before meals   -Please d/c with supplies: insulin pens, pen needles, glucometer, test strips, lancets, and alcohol pads   -Can fu with Endocrine Practice at [address] [place] [address], [address], [place] [address]; Ph # [phone]  -Will need ophthalmology follow up.   d/w pt to be more mindful of her diet, carb control and portion control, control rice and roti, start exercise as tolerated and that she needs closel followup with her PCP for medicastion titration  for her safety we are dc her on basal bolus insulin given marked hyperglycemia., however if she has better control outpt than insulin can be titrated off and she can be placed on medications such as ozempic /trulicty   pt states she will followup closely with her pcp after dc and start changing her lifstyle to incorportate healthier eating habits and exercise     #HTN  BP Goal < 130/80  Continue management per primary team    #HLD  LDL goal < 70  Patient is of child bearing age. Can check lipid panel outpatient and decide on statin initiation.

## 2022-04-06 NOTE — ED CDU PROVIDER DISPOSITION NOTE - NSFOLLOWUPINSTRUCTIONS_ED_ALL_ED_FT
Follow up with your primary care doctor within 1 week  Follow up with an endocrinologist within 1 month, Endocrine Practice at [address] [place] [address], [address], [place] [address];  # [phone], please call to make an appointment  Take Lantus 28 units at bedtime  Take Metformin 500mg (1 tablet) twice a day for 1 week, then increase the dose to 1000mg (2 tablets) twice a day  Take Keflex 500mg (1 tablet) 4 times a day for 7 days  Return to the ER with any worsening or concerning symptoms, weakness, dizziness, abdominal pain, nausea, vomiting, fever or any other concerns. Follow up with your primary care doctor within 1 week  Follow up with an endocrinologist within 1 month, Endocrine Practice at [address] [place] [address], [address], [place] [address];  # [phone], please call to make an appointment  Take Lantus 28 units at bedtime  Take Admelog 10 units three times a day with meals   Take Metformin 500mg (1 tablet) twice a day for 1 week, then increase the dose to 1000mg (2 tablets) twice a day  Take Keflex 500mg (1 tablet) 4 times a day for 7 days  Return to the ER with any worsening or concerning symptoms, weakness, dizziness, abdominal pain, nausea, vomiting, fever or any other concerns.

## 2022-04-06 NOTE — ED CDU PROVIDER SUBSEQUENT DAY NOTE - ATTENDING CONTRIBUTION TO CARE
Brief HPI:  43 yo F pmh obesity presented to ED for dizziness, weakness, polyuria and polydipsia x 1 week.  Pt was found to have FS of 367. Labs revealed a1c of 11.4 and pt was sent to the CDU for glycemic control and endocrine consult.  Patient asymptomatic on interview today.  Plan for endocrine consult for outpatient DM management recommendations.     Vitals:   Reviewed    Exam:    GEN:  Non-toxic appearing, non-distressed, speaking full sentences, non-diaphoretic, AAOx3  HEENT:  NCAT, neck supple, EOMI, PERRLA, sclera anicteric, no conjunctival pallor or injection, no stridor, normal voice, no tonsillar exudate, uvula midline  CV:  regular rhythm and rate, s1/s2 audible, no murmurs, rubs or gallops, peripheral pulses 2+ and symmetric  PULM:  non-labored respirations, lungs clear to auscultation bilaterally, no wheezes, crackles or rales  ABD:  non distended, non-tender, no rebound, no guarding, negative Quach's sign, bowel sounds normal, no cvat  MSK:  no gross deformity, non-tender extremities and joints, range of motion grossly normal appearing, no extremity edema, extremities warm and well perfused   NEURO:  AAOx3, CN II-XII intact, motor 5/5 in upper and lower extremities bilaterally, sensation grossly intact in extremities and trunk  SKIN:  warm, dry, no rash or vesicles

## 2022-04-06 NOTE — ED CDU PROVIDER SUBSEQUENT DAY NOTE - PROGRESS NOTE DETAILS
cdu scot matt: pt rested comfortably in bed all night, getting insulin, finger tsicks, had no complaints, will continue to monitor, reasses KHRIS Koehler: Spoke with endocrine attg , recommending increasing Lantus to 28 units for discharge with metformin 500mg BID )can increase to 1000BID in 1 week). Spoke with pts PMD  ( [phone]) pt can follow up with her. Pt will return to the ER with any worsening or concerning symptoms. KHRIS Koehler: Spoke with endocrine attg , recommending increasing Lantus to 28 units for discharge with metformin 500mg BID )can increase to 1000BID in 1 week). Spoke with pts PMD  ( [phone]), pt has an appointment today at 3:30 for follow up. Pt understands to check her fingerstick pre-meal and at bedtime, will keep a log. Pt will return to the ER with any worsening or concerning symptoms. At time of discharge, FS checked, 350, pt is well appearing, no signs/symptoms of DKA. Pt has insulin regimen to take at home as well as rx for metformin. She will return with any worsening or concerning symptoms. Discussed with cdu attg  after lunch with insulin, spoke with endocrine will see pt shortly Endocrine attg recommending to add short acting insulin 10 units TID with meals, discussed with pt. Will repeat FS at 4pm FS in 200s, will d/c home with basal/bolus insulin per endocrine recommendation. Discussed new medications with pt at length. She will return with any worsening or concerning symptoms.

## 2022-05-25 ENCOUNTER — EMERGENCY (EMERGENCY)
Facility: HOSPITAL | Age: 42
LOS: 1 days | Discharge: HOME | End: 2022-05-25
Attending: EMERGENCY MEDICINE | Admitting: STUDENT IN AN ORGANIZED HEALTH CARE EDUCATION/TRAINING PROGRAM
Payer: MEDICAID

## 2022-05-25 VITALS
RESPIRATION RATE: 15 BRPM | OXYGEN SATURATION: 100 % | DIASTOLIC BLOOD PRESSURE: 85 MMHG | HEART RATE: 96 BPM | SYSTOLIC BLOOD PRESSURE: 120 MMHG | TEMPERATURE: 98 F

## 2022-05-25 LAB
ALBUMIN SERPL ELPH-MCNC: 3.9 G/DL — SIGNIFICANT CHANGE UP (ref 3.3–5)
ALP SERPL-CCNC: 65 U/L — SIGNIFICANT CHANGE UP (ref 40–120)
ALT FLD-CCNC: 5 U/L — SIGNIFICANT CHANGE UP (ref 4–33)
ANION GAP SERPL CALC-SCNC: 9 MMOL/L — SIGNIFICANT CHANGE UP (ref 7–14)
APTT BLD: 28.1 SEC — SIGNIFICANT CHANGE UP (ref 27–36.3)
AST SERPL-CCNC: 16 U/L — SIGNIFICANT CHANGE UP (ref 4–32)
BASOPHILS # BLD AUTO: 0.04 K/UL — SIGNIFICANT CHANGE UP (ref 0–0.2)
BASOPHILS NFR BLD AUTO: 0.3 % — SIGNIFICANT CHANGE UP (ref 0–2)
BILIRUB SERPL-MCNC: 0.7 MG/DL — SIGNIFICANT CHANGE UP (ref 0.2–1.2)
BLD GP AB SCN SERPL QL: NEGATIVE — SIGNIFICANT CHANGE UP
BUN SERPL-MCNC: 15 MG/DL — SIGNIFICANT CHANGE UP (ref 7–23)
CALCIUM SERPL-MCNC: 8.9 MG/DL — SIGNIFICANT CHANGE UP (ref 8.4–10.5)
CHLORIDE SERPL-SCNC: 104 MMOL/L — SIGNIFICANT CHANGE UP (ref 98–107)
CO2 SERPL-SCNC: 24 MMOL/L — SIGNIFICANT CHANGE UP (ref 22–31)
CREAT SERPL-MCNC: 0.57 MG/DL — SIGNIFICANT CHANGE UP (ref 0.5–1.3)
EGFR: 116 ML/MIN/1.73M2 — SIGNIFICANT CHANGE UP
EOSINOPHIL # BLD AUTO: 0.79 K/UL — HIGH (ref 0–0.5)
EOSINOPHIL NFR BLD AUTO: 6.4 % — HIGH (ref 0–6)
FLUAV AG NPH QL: SIGNIFICANT CHANGE UP
FLUBV AG NPH QL: SIGNIFICANT CHANGE UP
GLUCOSE SERPL-MCNC: 139 MG/DL — HIGH (ref 70–99)
HCG SERPL-ACNC: <5 MIU/ML — SIGNIFICANT CHANGE UP
HCT VFR BLD CALC: 23.4 % — LOW (ref 34.5–45)
HGB BLD-MCNC: 5.9 G/DL — CRITICAL LOW (ref 11.5–15.5)
IANC: 7.92 K/UL — HIGH (ref 1.8–7.4)
IMM GRANULOCYTES NFR BLD AUTO: 0.2 % — SIGNIFICANT CHANGE UP (ref 0–1.5)
INR BLD: 1.09 RATIO — SIGNIFICANT CHANGE UP (ref 0.88–1.16)
LYMPHOCYTES # BLD AUTO: 2.92 K/UL — SIGNIFICANT CHANGE UP (ref 1–3.3)
LYMPHOCYTES # BLD AUTO: 23.7 % — SIGNIFICANT CHANGE UP (ref 13–44)
MCHC RBC-ENTMCNC: 13.7 PG — LOW (ref 27–34)
MCHC RBC-ENTMCNC: 25.2 GM/DL — LOW (ref 32–36)
MCV RBC AUTO: 54.3 FL — LOW (ref 80–100)
MONOCYTES # BLD AUTO: 0.63 K/UL — SIGNIFICANT CHANGE UP (ref 0–0.9)
MONOCYTES NFR BLD AUTO: 5.1 % — SIGNIFICANT CHANGE UP (ref 2–14)
NEUTROPHILS # BLD AUTO: 7.92 K/UL — HIGH (ref 1.8–7.4)
NEUTROPHILS NFR BLD AUTO: 64.3 % — SIGNIFICANT CHANGE UP (ref 43–77)
NRBC # BLD: 0 /100 WBCS — SIGNIFICANT CHANGE UP
NRBC # FLD: 0.03 K/UL — HIGH
PLATELET # BLD AUTO: 450 K/UL — HIGH (ref 150–400)
POTASSIUM SERPL-MCNC: 4.3 MMOL/L — SIGNIFICANT CHANGE UP (ref 3.5–5.3)
POTASSIUM SERPL-SCNC: 4.3 MMOL/L — SIGNIFICANT CHANGE UP (ref 3.5–5.3)
PROT SERPL-MCNC: 7.2 G/DL — SIGNIFICANT CHANGE UP (ref 6–8.3)
PROTHROM AB SERPL-ACNC: 12.6 SEC — SIGNIFICANT CHANGE UP (ref 10.5–13.4)
RBC # BLD: 4.31 M/UL — SIGNIFICANT CHANGE UP (ref 3.8–5.2)
RBC # FLD: 23.9 % — HIGH (ref 10.3–14.5)
RH IG SCN BLD-IMP: POSITIVE — SIGNIFICANT CHANGE UP
RH IG SCN BLD-IMP: POSITIVE — SIGNIFICANT CHANGE UP
RSV RNA NPH QL NAA+NON-PROBE: SIGNIFICANT CHANGE UP
SARS-COV-2 RNA SPEC QL NAA+PROBE: SIGNIFICANT CHANGE UP
SODIUM SERPL-SCNC: 137 MMOL/L — SIGNIFICANT CHANGE UP (ref 135–145)
WBC # BLD: 12.33 K/UL — HIGH (ref 3.8–10.5)
WBC # FLD AUTO: 12.33 K/UL — HIGH (ref 3.8–10.5)

## 2022-05-25 PROCEDURE — 99220: CPT

## 2022-05-25 RX ORDER — SODIUM CHLORIDE 9 MG/ML
1000 INJECTION, SOLUTION INTRAVENOUS
Refills: 0 | Status: DISCONTINUED | OUTPATIENT
Start: 2022-05-25 | End: 2022-05-29

## 2022-05-25 RX ORDER — GLUCAGON INJECTION, SOLUTION 0.5 MG/.1ML
1 INJECTION, SOLUTION SUBCUTANEOUS ONCE
Refills: 0 | Status: DISCONTINUED | OUTPATIENT
Start: 2022-05-25 | End: 2022-05-29

## 2022-05-25 RX ORDER — DEXTROSE 50 % IN WATER 50 %
25 SYRINGE (ML) INTRAVENOUS ONCE
Refills: 0 | Status: DISCONTINUED | OUTPATIENT
Start: 2022-05-25 | End: 2022-05-29

## 2022-05-25 RX ORDER — INSULIN LISPRO 100/ML
VIAL (ML) SUBCUTANEOUS AT BEDTIME
Refills: 0 | Status: DISCONTINUED | OUTPATIENT
Start: 2022-05-25 | End: 2022-05-29

## 2022-05-25 RX ORDER — DEXTROSE 50 % IN WATER 50 %
15 SYRINGE (ML) INTRAVENOUS ONCE
Refills: 0 | Status: DISCONTINUED | OUTPATIENT
Start: 2022-05-25 | End: 2022-05-29

## 2022-05-25 RX ORDER — INSULIN LISPRO 100/ML
VIAL (ML) SUBCUTANEOUS
Refills: 0 | Status: DISCONTINUED | OUTPATIENT
Start: 2022-05-25 | End: 2022-05-29

## 2022-05-25 RX ORDER — DEXTROSE 50 % IN WATER 50 %
12.5 SYRINGE (ML) INTRAVENOUS ONCE
Refills: 0 | Status: DISCONTINUED | OUTPATIENT
Start: 2022-05-25 | End: 2022-05-29

## 2022-05-25 NOTE — ED PROVIDER NOTE - PROGRESS NOTE DETAILS
Joseph Frankel PGY3: Patient with hg in 5s. Likely chronic as patient only mildly symptomatic (states that she sometimes feels like she will pass out). Will place in CDU for blood transfusion and will give patient GYN follow up. Patient stable at this time.

## 2022-05-25 NOTE — ED PROVIDER NOTE - ATTENDING CONTRIBUTION TO CARE
DR. RAMIREZ, ATTENDING MD-  I performed a face to face bedside interview with the patient regarding history of present illness, review of symptoms and past medical history. I completed an independent physical exam.  I have discussed the patient's plan of care with the resident.   Documentation as above in the note.    41 y/o female h/o dm2 fibroids c/o fatigue.  Found to be anemic on o/p labs.  Has heavy menses.  No active vag bld at this time.  Obtain cbc bmp t&s covid swab likely obs for prbc xfusion.

## 2022-05-25 NOTE — ED CDU PROVIDER INITIAL DAY NOTE - ATTENDING APP SHARED VISIT CONTRIBUTION OF CARE
CDU MD RAMIREZ:  I performed a face to face bedside interview with patient regarding history of present illness, review of symptoms and past medical history. I completed an independent physical exam.  I have discussed patient's plan of care with PA.   I agree with note as stated above, having amended the EMR as needed to reflect my findings. I have discussed the assessment and plan of care.  This includes during the time I functioned as the attending physician for this patient.    41 y/o female with heavy menses.  Sx anemia, no active bld, hds.  CDU for prbc xfusion.

## 2022-05-25 NOTE — ED PROVIDER NOTE - OBJECTIVE STATEMENT
42F presents with low Hb from outpatient. Pt states last 1.5 years has had increasingly heavy menses lasting longer than normal. Has known iron deficiency anemia but last blood draw was lower than usual and states was in 6's. States she is asymptomatic. No dizziness, no CP/SOB, AREVALO, n/v/d. Is not currently having vaginal bleeding. Has not followed up with OBGYN

## 2022-05-25 NOTE — ED PROVIDER NOTE - CLINICAL SUMMARY MEDICAL DECISION MAKING FREE TEXT BOX
42F presents with anemia on outpatient labs. Not actively bleeding. Check CBC, likely transfuse, reassess.

## 2022-05-25 NOTE — ED PROVIDER NOTE - PHYSICAL EXAMINATION
General: Well developed, well nourished  HEENT: Normocephalic and atraumatic, conjunctival pallor., Trachea midline.   Cardiac: Normal S1 and S2 w/ RRR. No MRG.  Pulmonary: CTA bilaterally. No increased WOB.   Abdominal: Soft, NTND  Neurologic: No focal sensory or motor deficits.  Musculoskeletal: No limited ROM or deformities  Vascular: Warm and well perfused  Skin: Color appropriate   Psychiatric: Appropriate mood and affect. No apparent risk to self or others.  Robin Ordoñez M.D.

## 2022-05-25 NOTE — ED ADULT TRIAGE NOTE - CHIEF COMPLAINT QUOTE
Pt arrives ambulatory to triage for low hg level, as per provider, hg is 6. Pt endorses recent heavy periods, saturating pads every 4 hrs. LMP: last week. Denies SOB/CP/dizziness. PMH: DM2, fibroids.

## 2022-05-25 NOTE — ED ADULT NURSE NOTE - OBJECTIVE STATEMENT
Pt received to rm   , awake and alert, A&OX4, ambulatory. States she was sent by her PCP for low hgb of 6. States she has had heavy periods over the last 1.5 yrs. Denies AC use. States she has a hx of iron deficiency anemia. Has never had a blood transfusion. Respirations even and unlabored. Resting comfortably. States she is not currently bleeding.  Denies CP, SOB, N/V, HA, dizziness, palpitations, fatigue. 20G IV placed to  RAC   . Bed in lowest position, call bell within reach. Will continue to monitor.

## 2022-05-25 NOTE — ED CDU PROVIDER INITIAL DAY NOTE - PHYSICAL EXAMINATION
General: Well developed, well nourished  HEENT: Normocephalic and atraumatic, conjunctival pallor., Trachea midline.   Cardiac: Normal S1 and S2 w/ RRR. No MRG.  Pulmonary: CTA bilaterally. No increased WOB.   Abdominal: Soft, NTND  Neurologic: No focal sensory or motor deficits.  Musculoskeletal: No limited ROM or deformities  Vascular: Warm and well perfused  Skin: Color appropriate   Psychiatric: Appropriate mood and affect. No apparent risk to self or others.

## 2022-05-25 NOTE — ED ADULT NURSE REASSESSMENT NOTE - NS ED NURSE REASSESS COMMENT FT1
Report received from [facility] . Patient A&Ox4, respirations even and unlabored. Patient vitals stable. Report given to CDU  RN. Patient taken via wheelchair with ED tech.

## 2022-05-25 NOTE — ED CDU PROVIDER INITIAL DAY NOTE - MEDICAL DECISION MAKING DETAILS
42F presents with low Hb from outpatient. Pt states last 1.5 years has had increasingly heavy menses lasting longer than normal. Has known iron deficiency anemia but last blood draw was lower than usual and states was in 6's. States she has generalized fatigue. No dizziness, no CP/SOB, AREVALO, n/v/d. Is not currently having vaginal bleeding. Has not followed up with OBGYN  Pt states she finished heavy menstrual cycle last week. Hgb today 5.9. Plan for 2 U PRBCs transfusion. Pt to be monitored in CDU during blood transfusions.

## 2022-05-26 VITALS
TEMPERATURE: 98 F | DIASTOLIC BLOOD PRESSURE: 73 MMHG | RESPIRATION RATE: 16 BRPM | SYSTOLIC BLOOD PRESSURE: 124 MMHG | OXYGEN SATURATION: 100 % | HEART RATE: 77 BPM

## 2022-05-26 LAB
BASOPHILS # BLD AUTO: 0 K/UL — SIGNIFICANT CHANGE UP (ref 0–0.2)
BASOPHILS NFR BLD AUTO: 0 % — SIGNIFICANT CHANGE UP (ref 0–2)
EOSINOPHIL # BLD AUTO: 0.84 K/UL — HIGH (ref 0–0.5)
EOSINOPHIL NFR BLD AUTO: 8.2 % — HIGH (ref 0–6)
HCT VFR BLD CALC: 29.5 % — LOW (ref 34.5–45)
HGB BLD-MCNC: 8 G/DL — LOW (ref 11.5–15.5)
IANC: 5.82 K/UL — SIGNIFICANT CHANGE UP (ref 1.8–7.4)
LYMPHOCYTES # BLD AUTO: 1.21 K/UL — SIGNIFICANT CHANGE UP (ref 1–3.3)
LYMPHOCYTES # BLD AUTO: 11.8 % — LOW (ref 13–44)
MCHC RBC-ENTMCNC: 16.2 PG — LOW (ref 27–34)
MCHC RBC-ENTMCNC: 27.1 GM/DL — LOW (ref 32–36)
MCV RBC AUTO: 59.8 FL — LOW (ref 80–100)
MONOCYTES # BLD AUTO: 0.37 K/UL — SIGNIFICANT CHANGE UP (ref 0–0.9)
MONOCYTES NFR BLD AUTO: 3.6 % — SIGNIFICANT CHANGE UP (ref 2–14)
NEUTROPHILS # BLD AUTO: 6.97 K/UL — SIGNIFICANT CHANGE UP (ref 1.8–7.4)
NEUTROPHILS NFR BLD AUTO: 67.3 % — SIGNIFICANT CHANGE UP (ref 43–77)
PLATELET # BLD AUTO: 432 K/UL — HIGH (ref 150–400)
RBC # BLD: 4.93 M/UL — SIGNIFICANT CHANGE UP (ref 3.8–5.2)
RBC # FLD: 29 % — HIGH (ref 10.3–14.5)
WBC # BLD: 10.22 K/UL — SIGNIFICANT CHANGE UP (ref 3.8–10.5)
WBC # FLD AUTO: 10.22 K/UL — SIGNIFICANT CHANGE UP (ref 3.8–10.5)

## 2022-05-26 PROCEDURE — 99217: CPT

## 2022-05-26 RX ORDER — FERROUS SULFATE 325(65) MG
1 TABLET ORAL
Qty: 60 | Refills: 0
Start: 2022-05-26 | End: 2022-06-24

## 2022-05-26 RX ORDER — ISOPROPYL ALCOHOL, BENZOCAINE .7; .06 ML/ML; ML/ML
1 SWAB TOPICAL
Qty: 100 | Refills: 1
Start: 2022-05-26 | End: 2022-07-14

## 2022-05-26 NOTE — ED CDU PROVIDER DISPOSITION NOTE - NSFOLLOWUPINSTRUCTIONS_ED_ALL_ED_FT
Follow up with at OBGYN Clinic at [facility] [phone]  Continue taking your iron pills    Worsening, continued or new concerning symptoms return to the emergency department.

## 2022-05-26 NOTE — ED CDU PROVIDER SUBSEQUENT DAY NOTE - ATTENDING APP SHARED VISIT CONTRIBUTION OF CARE
42F presents with low Hb from outpatient. Pt states last 1.5 years has had increasingly heavy menses lasting longer than normal. Has known iron deficiency anemia but last blood draw was lower than usual and states was in 6's. Pt was found to have a hemoglobin of 5.9 and was placed in cdu for blood transfusions. Pt s/p 2 units of prbc  overnight. Pt reports improvement of her symptoms  she reports having heavy menstrual periods over the last few months, denies rectal bleeding  denies fever, chills, chest pain, SOB, abdominal pain, diarrhea, dysuria, syncope, bleeding, new rash,weakness, numbness, blurred vision    ROS  otherwise negative as per HPI  Gen: Awake, Alert, WD, WN, NAD  Head:  NC/AT  Eyes:  PERRL, EOMI, Conjunctiva pink, lids normal, no scleral icterus  ENT: OP clear, no exudates,moist mucus membranes  Neck: supple, nontender, no meningismus, no JVD, trachea midline  Cardiac/CV:  S1 S2, RRR, no M/G/R  Respiratory/Pulm:  CTAB, good air movement, normal resp effort, no wheezes/stridor/retractions/rales/rhonchi  Gastrointestinal/Abdomen:  Soft, nontender, obese nondistended, +BS, no rebound/guarding  Back:  no CVAT, no MLT  Ext:  warm, well perfused, moving all extremities spontaneously, no peripheral edema, distal pulses intact  Skin: intact, no rash  Neuro:  AAOx3, sensation intact, motor 5/5 x 4 extremities, normal gait, speech clear      CDU plan of care     Repeat cbc   ferritin    f/u w/ CBC   d/c with obgyn and pmd follow up  ferrous sulfate 1 tab BID

## 2022-05-26 NOTE — ED CDU PROVIDER DISPOSITION NOTE - PATIENT PORTAL LINK FT
You can access the FollowMyHealth Patient Portal offered by [facility] by registering at the following website: [email or web address]. By joining [employer]’s FollowMyHealth portal, you will also be able to view your health information using other applications (apps) compatible with our system.

## 2022-05-26 NOTE — ED CDU PROVIDER SUBSEQUENT DAY NOTE - HISTORY
43 y/o female with pmhx of anemia, DUB, DM type 2 on insulin presents to ED for anemia. pt c/o fatigue. found with hg of 5.9 in ED. States has had vaginal bleeding intermittently for 1 year. no active bleeding now. pt clinically improved feeling better s/p 2 units, hg at 8.

## 2022-05-26 NOTE — ED CDU PROVIDER DISPOSITION NOTE - ATTENDING CONTRIBUTION TO CARE
42F presents with low Hb from outpatient. Pt states last 1.5 years has had increasingly heavy menses lasting longer than normal. Has known iron deficiency anemia but last blood draw was lower than usual and states was in 6's. Pt was found to have a hemoglobin of 5.9 and was placed in cdu for blood transfusions. Pt s/p 2 units of prbc  overnight. Pt reports improvement of her symptoms  she reports having heavy menstrual periods over the last few months, denies rectal bleeding. repeat h/h 8   denies fever, chills, chest pain, SOB, abdominal pain, diarrhea, dysuria, syncope, bleeding, new rash,weakness, numbness, blurred vision    ROS  otherwise negative as per HPI  Gen: Awake, Alert, WD, WN, NAD  Head:  NC/AT  Eyes:  PERRL, EOMI, Conjunctiva pink, lids normal, no scleral icterus  ENT: OP clear, no exudates,moist mucus membranes  Neck: supple, nontender, no meningismus, no JVD, trachea midline  Cardiac/CV:  S1 S2, RRR, no M/G/R  Respiratory/Pulm:  CTAB, good air movement, normal resp effort, no wheezes/stridor/retractions/rales/rhonchi  Gastrointestinal/Abdomen:  Soft, nontender, obese nondistended, +BS, no rebound/guarding  Back:  no CVAT, no MLT  Ext:  warm, well perfused, moving all extremities spontaneously, no peripheral edema, distal pulses intact  Skin: intact, no rash  Neuro:  AAOx3, sensation intact, motor 5/5 x 4 extremities, normal gait, speech clear      CDU plan of care     d/c with obgyn and pmd follow up  ferrous sulfate 1 tab BID.

## 2022-05-26 NOTE — ED CDU PROVIDER SUBSEQUENT DAY NOTE - MEDICAL DECISION MAKING DETAILS
41 y/o female with pmhx of anemia, DUB, DM type 2 on insulin presents to ED for anemia. pt c/o fatigue. found with hg of 5.9 in ED. States has had vaginal bleeding intermittently for 1 year. no active bleeding now. pt clinically improved feeling better s/p 2 units, hg at 8.

## 2022-05-31 PROBLEM — Z00.00 ENCOUNTER FOR PREVENTIVE HEALTH EXAMINATION: Status: ACTIVE | Noted: 2022-05-31

## 2022-05-31 PROBLEM — E11.9 TYPE 2 DIABETES MELLITUS WITHOUT COMPLICATIONS: Chronic | Status: ACTIVE | Noted: 2022-05-25

## 2022-06-08 ENCOUNTER — APPOINTMENT (OUTPATIENT)
Dept: OBGYN | Facility: CLINIC | Age: 42
End: 2022-06-08
Payer: MEDICAID

## 2022-06-08 ENCOUNTER — APPOINTMENT (OUTPATIENT)
Dept: ANTEPARTUM | Facility: CLINIC | Age: 42
End: 2022-06-08

## 2022-06-08 VITALS — WEIGHT: 253 LBS | DIASTOLIC BLOOD PRESSURE: 61 MMHG | SYSTOLIC BLOOD PRESSURE: 130 MMHG

## 2022-06-08 DIAGNOSIS — Z86.39 PERSONAL HISTORY OF OTHER ENDOCRINE, NUTRITIONAL AND METABOLIC DISEASE: ICD-10-CM

## 2022-06-08 DIAGNOSIS — Z82.49 FAMILY HISTORY OF ISCHEMIC HEART DISEASE AND OTHER DISEASES OF THE CIRCULATORY SYSTEM: ICD-10-CM

## 2022-06-08 DIAGNOSIS — Z01.419 ENCOUNTER FOR GYNECOLOGICAL EXAMINATION (GENERAL) (ROUTINE) W/OUT ABNORMAL FINDINGS: ICD-10-CM

## 2022-06-08 DIAGNOSIS — Z83.3 FAMILY HISTORY OF DIABETES MELLITUS: ICD-10-CM

## 2022-06-08 DIAGNOSIS — Z78.9 OTHER SPECIFIED HEALTH STATUS: ICD-10-CM

## 2022-06-08 PROCEDURE — 76830 TRANSVAGINAL US NON-OB: CPT

## 2022-06-08 PROCEDURE — 76856 US EXAM PELVIC COMPLETE: CPT

## 2022-06-08 PROCEDURE — 99386 PREV VISIT NEW AGE 40-64: CPT

## 2022-06-08 RX ORDER — GLIPIZIDE 5 MG/1
5 TABLET ORAL
Refills: 0 | Status: ACTIVE | COMMUNITY

## 2022-06-08 RX ORDER — INSULIN GLARGINE 100 [IU]/ML
100 INJECTION, SOLUTION SUBCUTANEOUS
Refills: 0 | Status: ACTIVE | COMMUNITY

## 2022-06-08 RX ORDER — FERROUS SULFATE 325(65) MG
325 (65 FE) TABLET ORAL
Refills: 0 | Status: ACTIVE | COMMUNITY

## 2022-06-08 RX ORDER — METFORMIN HYDROCHLORIDE 500 MG/1
500 TABLET, COATED ORAL
Refills: 0 | Status: ACTIVE | COMMUNITY

## 2022-06-11 NOTE — PHYSICAL EXAM
[Chaperone Present] : A chaperone was present in the examining room during all aspects of the physical examination [Appropriately responsive] : appropriately responsive [Alert] : alert [No Acute Distress] : no acute distress [Soft] : soft [Non-tender] : non-tender [Non-distended] : non-distended [No HSM] : No HSM [No Lesions] : no lesions [No Mass] : no mass [Oriented x3] : oriented x3 [Examination Of The Breasts] : a normal appearance [No Masses] : no breast masses were palpable [Labia Majora] : normal [Labia Minora] : normal [Normal] : normal [Uterine Adnexae] : normal [FreeTextEntry1] : [place]

## 2022-06-11 NOTE — PLAN
[FreeTextEntry1] : 41 y/o female presenting for annual exam:\par -f/u pap and GC/CT, and blood work done today\par -Requisition given for mammogram\par -gyn sono shows posterior fibroid 5.4 x 6x5.7cm, intramural calcified, normal ovaries- see official sono report\par -discussed use of Mirena to help lighten periods and control fibroids\par -f/u with Dr. George for IUD placement due to difficult gyn exam and further mangement of fibroids.\par

## 2022-06-11 NOTE — HISTORY OF PRESENT ILLNESS
[Y] : Positive pregnancy history [Regular Cycle Intervals] : periods have been regular [Currently Active] : currently active [Men] : men [Vaginal] : vaginal [No] : No [Patient would like to be screened for STIs] : Patient would like to be screened for STIs [TextBox_19] : pt never had a mammogra- will give req today [LMPDate] : 5/20/22 [PGxTotal] : 1 [[facility]] : 1 [PGHxPremature] : 0 [PGHxAbortions] : 1 [[facility] Hospitaliving] : 1 [PGHxABInduced] : 0 [PGHxABSpont] : 0 [PGHxEctopic] : 0 [PGHxMultBirths] : 0 [FreeTextEntry1] : 5/20/22

## 2022-06-13 LAB
ALBUMIN SERPL ELPH-MCNC: 4.2 G/DL
ALP BLD-CCNC: 69 U/L
ALT SERPL-CCNC: 9 U/L
ANION GAP SERPL CALC-SCNC: 12 MMOL/L
AST SERPL-CCNC: 17 U/L
BILIRUB SERPL-MCNC: 0.6 MG/DL
BUN SERPL-MCNC: 15 MG/DL
C TRACH RRNA SPEC QL NAA+PROBE: NOT DETECTED
CALCIUM SERPL-MCNC: 9.1 MG/DL
CHLORIDE SERPL-SCNC: 104 MMOL/L
CO2 SERPL-SCNC: 24 MMOL/L
CREAT SERPL-MCNC: 0.62 MG/DL
CYTOLOGY CVX/VAG DOC THIN PREP: NORMAL
EGFR: 114 ML/MIN/1.73M2
FSH SERPL-MCNC: 7.5 IU/L
GLUCOSE SERPL-MCNC: 79 MG/DL
HBV SURFACE AG SER QL: NONREACTIVE
HIV1+2 AB SPEC QL IA.RAPID: NONREACTIVE
HPV 16 E6+E7 MRNA CVX QL NAA+PROBE: NOT DETECTED
HPV HIGH+LOW RISK DNA PNL CVX: NOT DETECTED
HPV18+45 E6+E7 MRNA CVX QL NAA+PROBE: NOT DETECTED
LH SERPL-ACNC: 9.2 IU/L
N GONORRHOEA RRNA SPEC QL NAA+PROBE: NOT DETECTED
POTASSIUM SERPL-SCNC: 4.1 MMOL/L
PROLACTIN SERPL-MCNC: 14.5 NG/ML
PROT SERPL-MCNC: 7.4 G/DL
SODIUM SERPL-SCNC: 140 MMOL/L
SOURCE AMPLIFICATION: NORMAL
T PALLIDUM AB SER QL IA: NEGATIVE
T4 FREE SERPL-MCNC: 1.2 NG/DL
T4 SERPL-MCNC: 6.6 UG/DL
TESTOST SERPL-MCNC: 15 NG/DL
TSH SERPL-ACNC: 3.2 UIU/ML

## 2022-06-15 LAB
BASOPHILS # BLD AUTO: 0.07 K/UL
BASOPHILS NFR BLD AUTO: 0.6 %
EOSINOPHIL # BLD AUTO: 0.77 K/UL
EOSINOPHIL NFR BLD AUTO: 6.6 %
ESTIMATED AVERAGE GLUCOSE: 134 MG/DL
HBA1C MFR BLD HPLC: 6.3 %
HCT VFR BLD CALC: 33 %
HGB BLD-MCNC: 8.3 G/DL
HSV 1+2 IGG SER IA-IMP: POSITIVE
HSV 1+2 IGG SER IA-IMP: POSITIVE
HSV1 IGG SER QL: 46.3 INDEX
HSV2 IGG SER QL: 12.6 INDEX
IMM GRANULOCYTES NFR BLD AUTO: 0.3 %
LYMPHOCYTES # BLD AUTO: 3.26 K/UL
LYMPHOCYTES NFR BLD AUTO: 28 %
MAN DIFF?: NORMAL
MCHC RBC-ENTMCNC: 16.3 PG
MCHC RBC-ENTMCNC: 25.2 GM/DL
MCV RBC AUTO: 65 FL
MONOCYTES # BLD AUTO: 0.84 K/UL
MONOCYTES NFR BLD AUTO: 7.2 %
NEUTROPHILS # BLD AUTO: 6.68 K/UL
NEUTROPHILS NFR BLD AUTO: 57.3 %
PLATELET # BLD AUTO: 542 K/UL
RBC # BLD: 5.08 M/UL
RBC # FLD: 31 %
WBC # FLD AUTO: 11.65 K/UL

## 2022-06-20 ENCOUNTER — APPOINTMENT (OUTPATIENT)
Dept: OBGYN | Facility: CLINIC | Age: 42
End: 2022-06-20
Payer: MEDICAID

## 2022-06-20 VITALS — DIASTOLIC BLOOD PRESSURE: 84 MMHG | WEIGHT: 252 LBS | SYSTOLIC BLOOD PRESSURE: 132 MMHG

## 2022-06-20 PROCEDURE — 99214 OFFICE O/P EST MOD 30 MIN: CPT

## 2022-06-22 NOTE — PHYSICAL EXAM
[Appropriately responsive] : appropriately responsive [Alert] : alert [No Acute Distress] : no acute distress [Soft] : soft [Non-tender] : non-tender [Non-distended] : non-distended [No HSM] : No HSM [No Lesions] : no lesions [No Mass] : no mass [Oriented x3] : oriented x3 [FreeTextEntry3] : supple [Labia Majora] : normal [Labia Minora] : normal [Normal] : normal [Uterine Adnexae] : normal [FreeTextEntry5] : Unable to visualize due to improper instruments. Cervix palpated very high in vaginal canal

## 2022-06-22 NOTE — HISTORY OF PRESENT ILLNESS
[FreeTextEntry1] : The patient is a 42 y.o. with 6 month history of heavy vaginal bleeding, known fibroid uterus presenting today for evaluation and management. Her LMP was 5/20. She reports heavy bleeding for the first 2-3 days of her period where she often soaks through an overnight pad every 2 hours. She did require a blood transfusion a couple months ago. She had a sonogram performed at her prior visit which showed a 5.4x6x5.6cm posterior calcified fibroid, endometrial thickness of 16mm. \par Management options previously discussed and patient desires a Mirena IUD.

## 2022-06-22 NOTE — PLAN
[FreeTextEntry1] : 42 y.o. with AUB-L presenting for management options\par -Plan for EMB, Mirena IUD insertion\par -Unable to perform procedures in office due to difficult exam and position of cervix. Discussed options including hysteroscopy, D&C, Mirena IUD insertion in OR. Patient would like to proceed with above procedure\par -Will schedule ASAP

## 2022-07-12 ENCOUNTER — OUTPATIENT (OUTPATIENT)
Dept: OUTPATIENT SERVICES | Facility: HOSPITAL | Age: 42
LOS: 1 days | End: 2022-07-12

## 2022-07-12 VITALS
DIASTOLIC BLOOD PRESSURE: 74 MMHG | HEART RATE: 86 BPM | HEIGHT: 62 IN | OXYGEN SATURATION: 100 % | TEMPERATURE: 98 F | RESPIRATION RATE: 18 BRPM | SYSTOLIC BLOOD PRESSURE: 113 MMHG | WEIGHT: 246.92 LBS

## 2022-07-12 DIAGNOSIS — N92.0 EXCESSIVE AND FREQUENT MENSTRUATION WITH REGULAR CYCLE: ICD-10-CM

## 2022-07-12 DIAGNOSIS — E11.9 TYPE 2 DIABETES MELLITUS WITHOUT COMPLICATIONS: ICD-10-CM

## 2022-07-12 DIAGNOSIS — Z98.891 HISTORY OF UTERINE SCAR FROM PREVIOUS SURGERY: Chronic | ICD-10-CM

## 2022-07-12 DIAGNOSIS — D64.9 ANEMIA, UNSPECIFIED: ICD-10-CM

## 2022-07-12 LAB
A1C WITH ESTIMATED AVERAGE GLUCOSE RESULT: 5 % — SIGNIFICANT CHANGE UP (ref 4–5.6)
ANION GAP SERPL CALC-SCNC: 10 MMOL/L — SIGNIFICANT CHANGE UP (ref 7–14)
BUN SERPL-MCNC: 14 MG/DL — SIGNIFICANT CHANGE UP (ref 7–23)
CALCIUM SERPL-MCNC: 9.2 MG/DL — SIGNIFICANT CHANGE UP (ref 8.4–10.5)
CHLORIDE SERPL-SCNC: 103 MMOL/L — SIGNIFICANT CHANGE UP (ref 98–107)
CO2 SERPL-SCNC: 27 MMOL/L — SIGNIFICANT CHANGE UP (ref 22–31)
CREAT SERPL-MCNC: 0.6 MG/DL — SIGNIFICANT CHANGE UP (ref 0.5–1.3)
EGFR: 115 ML/MIN/1.73M2 — SIGNIFICANT CHANGE UP
ESTIMATED AVERAGE GLUCOSE: 97 — SIGNIFICANT CHANGE UP
GLUCOSE SERPL-MCNC: 75 MG/DL — SIGNIFICANT CHANGE UP (ref 70–99)
HCG UR QL: NEGATIVE — SIGNIFICANT CHANGE UP
HCT VFR BLD CALC: 32.9 % — LOW (ref 34.5–45)
HGB BLD-MCNC: 8.7 G/DL — LOW (ref 11.5–15.5)
MCHC RBC-ENTMCNC: 16.9 PG — LOW (ref 27–34)
MCHC RBC-ENTMCNC: 26.4 GM/DL — LOW (ref 32–36)
MCV RBC AUTO: 63.9 FL — LOW (ref 80–100)
NRBC # BLD: 0 /100 WBCS — SIGNIFICANT CHANGE UP
NRBC # FLD: 0 K/UL — SIGNIFICANT CHANGE UP
PLATELET # BLD AUTO: 580 K/UL — HIGH (ref 150–400)
POTASSIUM SERPL-MCNC: 4 MMOL/L — SIGNIFICANT CHANGE UP (ref 3.5–5.3)
POTASSIUM SERPL-SCNC: 4 MMOL/L — SIGNIFICANT CHANGE UP (ref 3.5–5.3)
RBC # BLD: 5.15 M/UL — SIGNIFICANT CHANGE UP (ref 3.8–5.2)
RBC # FLD: 27.6 % — HIGH (ref 10.3–14.5)
SODIUM SERPL-SCNC: 140 MMOL/L — SIGNIFICANT CHANGE UP (ref 135–145)
WBC # BLD: 9.18 K/UL — SIGNIFICANT CHANGE UP (ref 3.8–10.5)
WBC # FLD AUTO: 9.18 K/UL — SIGNIFICANT CHANGE UP (ref 3.8–10.5)

## 2022-07-12 RX ORDER — SODIUM CHLORIDE 9 MG/ML
1000 INJECTION, SOLUTION INTRAVENOUS
Refills: 0 | Status: DISCONTINUED | OUTPATIENT
Start: 2022-07-28 | End: 2022-08-11

## 2022-07-12 NOTE — H&P PST ADULT - ASSESSMENT
41 yo female presents to PST unit with pre-op diagnosis of excess & frequent Menstruation scheduled for dilation curettage hysteroscopy, insertion of Mirena IUD with Dr. Maldonado.

## 2022-07-12 NOTE — H&P PST ADULT - ATTENDING COMMENTS
Pt seen. Agree with above. Pt with AUB-L, desires Mirena IUD insertion but unable to tolerate in office insertion. Scheduled for hysteroscopy, D&C, Mirena IUD insertion. Written consent obtained.    RUDY George MD

## 2022-07-12 NOTE — H&P PST ADULT - HISTORY OF PRESENT ILLNESS
43 yo female presents to PST unit with pre-op diagnosis of excess & frequent Menstruation scheduled for dilation curettage hysteroscopy, insertion of Mirena IUD with Dr. Maldonado.

## 2022-07-12 NOTE — H&P PST ADULT - PROBLEM SELECTOR PLAN 1
-Scheduled for dilation curettage hysteroscopy, insertion of Mirena IUD with Dr. Maldonado on 728/22.   -Verbal and written pre-op instructions provided to patient. Patient verbalized understanding and will call surgeons office for revised instructions if surgery is rescheduled.   -Pepcid for GI prophylaxis provided.   -Patient aware of need for COVID testing prior to  procedure at a [facility], given list of locations and advised to get tested within 3 to 5 days of procedure.   -Urine pregnancy test DOS-Urine cup provided.

## 2022-07-12 NOTE — H&P PST ADULT - PROBLEM SELECTOR PLAN 2
Pt. instructed to hold Metformin the night before and morning of procedure. Accucheck DOS.   Hold Glipizide AM of surgery   Reduce evening dose of Basaglar to 24 units night before surgery.  Pt. obtained medical eval prior to surgery- PST N{P requesting copy.

## 2022-07-15 ENCOUNTER — EMERGENCY (EMERGENCY)
Facility: HOSPITAL | Age: 42
LOS: 1 days | Discharge: HOME | End: 2022-07-15
Attending: STUDENT IN AN ORGANIZED HEALTH CARE EDUCATION/TRAINING PROGRAM | Admitting: STUDENT IN AN ORGANIZED HEALTH CARE EDUCATION/TRAINING PROGRAM

## 2022-07-15 VITALS
HEART RATE: 84 BPM | HEIGHT: 62 IN | RESPIRATION RATE: 20 BRPM | OXYGEN SATURATION: 100 % | TEMPERATURE: 98 F | SYSTOLIC BLOOD PRESSURE: 120 MMHG | DIASTOLIC BLOOD PRESSURE: 64 MMHG

## 2022-07-15 DIAGNOSIS — Z98.891 HISTORY OF UTERINE SCAR FROM PREVIOUS SURGERY: Chronic | ICD-10-CM

## 2022-07-15 LAB
ALBUMIN SERPL ELPH-MCNC: 4.1 G/DL — SIGNIFICANT CHANGE UP (ref 3.3–5)
ALP SERPL-CCNC: 69 U/L — SIGNIFICANT CHANGE UP (ref 40–120)
ALT FLD-CCNC: 9 U/L — SIGNIFICANT CHANGE UP (ref 4–33)
ANION GAP SERPL CALC-SCNC: 10 MMOL/L — SIGNIFICANT CHANGE UP (ref 7–14)
APTT BLD: 28.1 SEC — SIGNIFICANT CHANGE UP (ref 27–36.3)
AST SERPL-CCNC: 15 U/L — SIGNIFICANT CHANGE UP (ref 4–32)
BILIRUB SERPL-MCNC: 0.5 MG/DL — SIGNIFICANT CHANGE UP (ref 0.2–1.2)
BUN SERPL-MCNC: 13 MG/DL — SIGNIFICANT CHANGE UP (ref 7–23)
CALCIUM SERPL-MCNC: 9 MG/DL — SIGNIFICANT CHANGE UP (ref 8.4–10.5)
CHLORIDE SERPL-SCNC: 105 MMOL/L — SIGNIFICANT CHANGE UP (ref 98–107)
CO2 SERPL-SCNC: 26 MMOL/L — SIGNIFICANT CHANGE UP (ref 22–31)
CREAT SERPL-MCNC: 0.62 MG/DL — SIGNIFICANT CHANGE UP (ref 0.5–1.3)
EGFR: 114 ML/MIN/1.73M2 — SIGNIFICANT CHANGE UP
GLUCOSE SERPL-MCNC: 114 MG/DL — HIGH (ref 70–99)
HCG SERPL-ACNC: <5 MIU/ML — SIGNIFICANT CHANGE UP
HCT VFR BLD CALC: 31.9 % — LOW (ref 34.5–45)
HGB BLD-MCNC: 8.8 G/DL — LOW (ref 11.5–15.5)
IANC: 4.94 K/UL — SIGNIFICANT CHANGE UP (ref 1.8–7.4)
INR BLD: 1.03 RATIO — SIGNIFICANT CHANGE UP (ref 0.88–1.16)
LIDOCAIN IGE QN: 36 U/L — SIGNIFICANT CHANGE UP (ref 7–60)
MCHC RBC-ENTMCNC: 17.3 PG — LOW (ref 27–34)
MCHC RBC-ENTMCNC: 27.6 GM/DL — LOW (ref 32–36)
MCV RBC AUTO: 62.8 FL — LOW (ref 80–100)
PLATELET # BLD AUTO: 558 K/UL — HIGH (ref 150–400)
POTASSIUM SERPL-MCNC: 4.2 MMOL/L — SIGNIFICANT CHANGE UP (ref 3.5–5.3)
POTASSIUM SERPL-SCNC: 4.2 MMOL/L — SIGNIFICANT CHANGE UP (ref 3.5–5.3)
PROT SERPL-MCNC: 7.4 G/DL — SIGNIFICANT CHANGE UP (ref 6–8.3)
PROTHROM AB SERPL-ACNC: 12 SEC — SIGNIFICANT CHANGE UP (ref 10.5–13.4)
RBC # BLD: 5.08 M/UL — SIGNIFICANT CHANGE UP (ref 3.8–5.2)
RBC # FLD: 26.8 % — HIGH (ref 10.3–14.5)
SODIUM SERPL-SCNC: 141 MMOL/L — SIGNIFICANT CHANGE UP (ref 135–145)
TROPONIN T, HIGH SENSITIVITY RESULT: 6 NG/L — SIGNIFICANT CHANGE UP
WBC # BLD: 9.77 K/UL — SIGNIFICANT CHANGE UP (ref 3.8–10.5)
WBC # FLD AUTO: 9.77 K/UL — SIGNIFICANT CHANGE UP (ref 3.8–10.5)

## 2022-07-15 PROCEDURE — 99284 EMERGENCY DEPT VISIT MOD MDM: CPT | Mod: 25

## 2022-07-15 PROCEDURE — 93010 ELECTROCARDIOGRAM REPORT: CPT

## 2022-07-15 PROCEDURE — 71046 X-RAY EXAM CHEST 2 VIEWS: CPT | Mod: 26

## 2022-07-15 NOTE — ED PROVIDER NOTE - PROGRESS NOTE
History  Chief Complaint   Patient presents with    High Blood Pressure     Pt states that he was taking his blood pressure at home and states that it was 208/113 and states that it was also taken manually  68-year-old male presents to the emergency department with his wife from home  He has her provide much of the history  She shares that on Sunday (2 days ago) he went to transfer from the bed to his wheelchair  She notes that he did not have the strength to slide over    He went down to the ground without injuring anything  His blood pressure was identified to be 80/59 by medics were summoned  His blood pressure kenneth to the 90s and he chose to stay home  Yesterday his blood pressure was 132/80 in the morning  It fluctuated throughout the day though at 1 point was 200/100  The blood pressure spiked today up to 210/108  Patient had not had a change in activity to account for blood pressure change  Soniya suspected that anxiety could have been elevating this  The patient did try Xanax 0 5 mg at 11:00  This only made him sleepy  Blood pressure remained elevated with systolics greater than 178  EMS was contacted for transportation to the hospital with concern for the very elevated blood pressures  Blood pressure was 150/80 for EMS  Patient did hold all of his medications on Sunday after having had hypotension  All medications have been resumed  His blood pressure typically in the 130s ue346s over 80s  Patient has had a general decline in his condition over the last 6 months  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 while in [employer]  He additionally has history of depression and sleep disturbance  He had been off of 3 medications for 4 to 5 days until they were refilled yesterday (Zoloft, trazodone and Ambien)  Wife notes that several months ago the patient required hospitalization    She expresses concern that based on how his condition has been progressing that he may require this again  At the time he was admitted he was experiencing significant diarrhea  Ultimately this was determined to be a side effect of his metformin  Due to the degree of diarrhea his concern for stool incontinence he had been eating last and had become quite depressed  Wife notes that daily life has become much more challenging  Patient has been more anxious and depressed  There is no SI or HI  Patient had been attending physical therapy but discontinued this feeling that was not helping  She notes that in addition to loss of his lower extremity strength he has lost much strength and in the upper extremities as well  The majority of his care is received through the [place]  She wishes someone was overseeing all of his care such as a   Prior to Admission Medications   Prescriptions Last Dose Informant Patient Reported? Taking?    CYANOCOBALAMIN PO Unknown at Unknown time  Yes No   Sig: Take 1,000 mcg by mouth     MAGNESIUM CARBONATE PO   Yes Yes   Sig: Take 400 mg by mouth daily     acetaminophen (TYLENOL) 325 mg tablet   Yes Yes   Sig: Take 650 mg by mouth 2 (two) times a day   apixaban (ELIQUIS) 5 mg   Yes Yes   Sig: Take 5 mg by mouth 2 (two) times a day   carvedilol (COREG) 25 mg tablet   Yes Yes   Sig: Take 25 mg by mouth 2 (two) times a day with meals   cholecalciferol (VITAMIN D3) 1,000 units tablet   Yes Yes   Sig: Take 5,000 Units by mouth daily   co-enzyme Q-10 50 MG capsule   Yes Yes   Sig: Take 100 mg by mouth daily   insulin aspart (NovoLOG) 100 units/mL injection   Yes Yes   Sig: Inject under the skin 3 (three) times a day before meals Sliding scale   insulin detemir (LEVEMIR) 100 units/mL subcutaneous injection   Yes Yes   Sig: Inject 30 Units under the skin daily at bedtime     lisinopril-hydrochlorothiazide (PRINZIDE,ZESTORETIC) 20-12 5 MG per tablet   Yes Yes   Sig: Take 2 tablets by mouth daily   omeprazole (PriLOSEC) 20 mg delayed release capsule   Yes Yes Sig: Take 20 mg by mouth daily   rosuvastatin (CRESTOR) 5 mg tablet   Yes Yes   Sig: Take 5 mg by mouth daily   sertraline (ZOLOFT) 100 mg tablet   Yes Yes   Sig: Take 50 mg by mouth daily   traZODone (DESYREL) 50 mg tablet   Yes Yes   Sig: Take 50 mg by mouth daily at bedtime   zolpidem (AMBIEN CR) 12 5 MG CR tablet   Yes Yes   Sig: Take 12 5 mg by mouth daily at bedtime as needed for sleep      Facility-Administered Medications: None       Past Medical History:   Diagnosis Date    At[facility] fibrillation ([facility] [address] )     [facility]     Hypertension        Past Surgical History:   Procedure Laterality Date    ABLATION OF DYSRHYTHMIC FOCUS      for a-fib    CHOLECYSTECTOMY      TOTAL SHOULDER REPLACEMENT Right        History reviewed  No pertinent family history  I have reviewed and agree with the history as documented  Social History   Substance Use Topics    Smoking status: Former Smoker    Smokeless tobacco: Never Used    Alcohol use No        Review of Systems   Constitutional: Negative for fever  Respiratory: Negative for shortness of breath  Cardiovascular: Negative for chest pain  Gastrointestinal: Negative for abdominal pain  Skin: Negative for rash  Neurological: Negative for headaches  Psychiatric/Behavioral: Positive for dysphoric mood  All other systems reviewed and are negative  Physical Exam  Physical Exam   Constitutional: He is oriented to person, place, and time  He appears well-nourished  Patient with atrophy of the upper and lower extremities  Able to pull self forward to a seated position  HENT:   Head: Normocephalic  Eyes: Conjunctivae and EOM are normal    Cardiovascular: Normal rate and regular rhythm  Pulses:       Posterior tibial pulses are 2+ on the right side, and 2+ on the left side  Pulmonary/Chest: Effort normal and breath sounds normal    Abdominal: Soft  Bowel sounds are normal    Musculoskeletal: Normal range of motion   He exhibits no edema or tenderness  Neurological: He is alert and oriented to person, place, and time  No cranial nerve deficit  Coordination normal    Patient with good strength of the upper extremities  4/5 strength with bilateral lower extremity maneuvers  Skin: Skin is warm and dry  Psychiatric: He has a normal mood and affect  His behavior is normal    Nursing note and vitals reviewed  Vital Signs  ED Triage Vitals [07/24/18 1216]   Temperature Pulse Respirations Blood Pressure SpO2   98 6 °F (37 °C) 57 16 (!) 185/89 99 %      Temp Source Heart Rate Source Patient Position - Orthostatic VS BP Location FiO2 (%)   Oral Monitor Lying Right arm --      Pain Score       No Pain           Vitals:    07/24/18 1419 07/24/18 1420 07/24/18 1422 07/24/18 1426   BP: 170/87 167/88 161/77 (!) 171/77   Pulse: 57 56 68 64   Patient Position - Orthostatic VS: Lying - Orthostatic VS Sitting - Orthostatic VS Standing - Orthostatic VS Standing for 3 minutes - Orthostatic VS       Visual Acuity      ED Medications  Medications - No data to display    Diagnostic Studies  Results Reviewed     Procedure Component Value Units Date/Time    TSH [[id number]]  (Normal) Collected:  07/24/18 1411    Lab Status:  Final result Specimen:  Blood from Hand, Right Updated:  07/24/18 1442     TSH 3RD GENERATON 0 745 uIU/mL     Narrative:         Patients undergoing fluorescein dye angiography may retain small amounts of fluorescein in the body for 48-72 hours post procedure  Samples containing fluorescein can produce falsely depressed TSH values  If the patient had this procedure,a specimen should be resubmitted post fluorescein clearance      Troponin I [[id number]]  (Normal) Collected:  07/24/18 1411    Lab Status:  Final result Specimen:  Blood from Hand, Right Updated:  07/24/18 1436     Troponin I <0 02 ng/mL     Comprehensive metabolic panel [[id number]]  (Abnormal) Collected:  07/24/18 1411    Lab Status:  Final result Specimen:  Blood from Hand, Right Updated: 07/24/18 1434     Sodium 142 mmol/L      Potassium 3 8 mmol/L      Chloride 102 mmol/L      CO2 30 mmol/L      Anion Gap 10 mmol/L      BUN 22 mg/dL      Creatinine 1 01 mg/dL      Glucose 142 (H) mg/dL      Calcium 8 7 mg/dL      AST 23 U/L      ALT 38 U/L      Alkaline Phosphatase 85 U/L      Total Protein 6 7 g/dL      Albumin 3 3 (L) g/dL      Total Bilirubin 0 40 mg/dL      eGFR 75 ml/min/1 73sq m     Narrative:         National Kidney Disease Education Program recommendations are as follows:  GFR calculation is accurate only with a steady state creatinine  Chronic Kidney disease less than 60 ml/min/1 73 sq  meters  Kidney failure less than 15 ml/min/1 73 sq  meters  CBC and differential [[id number]]  (Abnormal) Collected:  07/24/18 1411    Lab Status:  Final result Specimen:  Blood from Hand, Right Updated:  07/24/18 1418     WBC 9 29 Thousand/uL      RBC 4 28 Million/uL      Hemoglobin 11 1 (L) g/dL      Hematocrit 36 6 %      MCV 86 fL      MCH 25 9 (L) pg      MCHC 30 3 (L) g/dL      RDW 16 2 (H) %      MPV 10 4 fL      Platelets 194 Thousands/uL      Neutrophils Relative 70 %      Lymphocytes Relative 15 %      Monocytes Relative 14 (H) %      Eosinophils Relative 1 %      Basophils Relative 0 %      Neutrophils Absolute 6 49 Thousands/µL      Lymphocytes Absolute 1 39 Thousands/µL      Monocytes Absolute 1 29 (H) Thousand/µL      Eosinophils Absolute 0 11 Thousand/µL      Basophils Absolute 0 01 Thousands/µL                  No orders to display              Procedures  Procedures       Phone Contacts  ED Phone Contact    ED Course  ED Course as of Aug 01 2215   Tue Jul 24, 2018   1331 Wife re-expresses concern that pt  Needs to be back in physical therapy  She additionally expresses concern that depression is factoring into how he has been feeling  His condition has been progressive with regard to the weakness & she notes he has had increased frustration regarding inability to perform certain tasks  65 I spoke w/  Aleah Barillas  He will meet w/ pt  & wife to determine what additional services may be available to him  MDM  CritCare Time    Disposition  Final diagnoses:   Neuropathy   Elevated blood pressure reading     Time reflects when diagnosis was documented in both MDM as applicable and the Disposition within this note     Time User Action Codes Description Comment    7/24/2018  3:19 PM Bettey Shearing A Add [G62 9] Neuropathy     7/24/2018  3:21 PM Jairo, Bettey Shearing A Add [R03 0] Elevated blood pressure reading     7/24/2018  3:21 PM Bettey Shearing A Modify [G62 9] Neuropathy     7/24/2018  3:21 PM Bettey Shearing A Modify [R03 0] Elevated blood pressure reading       ED Disposition     ED Disposition Condition Comment    Discharge  Lawlre Plunk discharge to home/self care  Condition at discharge: Good        Follow-up Information     Follow up With Specialties Details Why [facility]  Follow up  [address] [facility]   [address]    Talib Rae [address]            Discharge Medication List as of 7/24/2018  3:23 PM      CONTINUE these medications which have NOT CHANGED    Details   acetaminophen (TYLENOL) 325 mg tablet Take 650 mg by mouth 2 (two) times a day, Historical Med      apixaban (ELIQUIS) 5 mg Take 5 mg by mouth 2 (two) times a day, Until Discontinued, Historical Med      carvedilol (COREG) 25 mg tablet Take 25 mg by mouth 2 (two) times a day with meals, Until Discontinued, Historical Med      cholecalciferol (VITAMIN D3) 1,000 units tablet Take 5,000 Units by mouth daily, Historical Med      co-enzyme Q-10 50 MG capsule Take 100 mg by mouth daily, Until Discontinued, Historical Med      insulin aspart (NovoLOG) 100 units/mL injection Inject under the skin 3 (three) times a day before meals Sliding scale, Historical Med      insulin detemir (LEVEMIR) 100 units/mL subcutaneous injection Inject 30 Units under the skin daily at bedtime  , Historical Med      lisinopril-hydrochlorothiazide (PRINZIDE,ZESTORETIC) 20-12 5 MG per tablet Take 2 tablets by mouth daily, Historical Med      MAGNESIUM CARBONATE PO Take 400 mg by mouth daily  , Historical Med      omeprazole (PriLOSEC) 20 mg delayed release capsule Take 20 mg by mouth daily, Until Discontinued, Historical Med      rosuvastatin (CRESTOR) 5 mg tablet Take 5 mg by mouth daily, Historical Med      sertraline (ZOLOFT) 100 mg tablet Take 50 mg by mouth daily, Historical Med      traZODone (DESYREL) 50 mg tablet Take 50 mg by mouth daily at bedtime, Historical Med      zolpidem (AMBIEN CR) 12 5 MG CR tablet Take 12 5 mg by mouth daily at bedtime as needed for sleep, Historical Med      CYANOCOBALAMIN PO Take 1,000 mcg by mouth  , Historical Med             Outpatient Discharge Orders  Ambulatory Referral to Home Health   Standing Status: Future  Standing Exp   Date: 01/24/19         ED Provider  Electronically Signed by           Kearney Najjar, MD  08/01/18 1583 Stable.

## 2022-07-15 NOTE — ED ADULT TRIAGE NOTE - CHIEF COMPLAINT QUOTE
Pt c/o midsternal CP that has progressively been getting worse over the last few days. Pt denies any SOB, n/v.

## 2022-07-15 NOTE — ED PROVIDER NOTE - CLINICAL SUMMARY MEDICAL DECISION MAKING FREE TEXT BOX
42F, hx of DM, anemia who presents with chest tightness. Afebrile, vitals signs normal. Well appearing. No neuro deficits to suggest dissection. No infectious symptoms to suggest PNA. No trauma to chest wall. No VTE risk factors. Tolerating PO. Stooling/voiding without any issues. Will perform ACS work and consider CDU admission after shared decision making with patient.

## 2022-07-15 NOTE — ED PROVIDER NOTE - OBJECTIVE STATEMENT
Patient is a 42F, DM, anemia who presents with chest tightness. Reports intermittent chest tightness for the past one week. Non-radiating, non-positional. Worse with activity. No PND/orthopnea. No hx of VTE. No unilateral leg swelling. No associated SOB. Tolerating PO. Stooling/voiding without any issues. No headaches, fevers, chills, cough, sputum, belly pain, nvd, dysuria, hematuria, recent sick contacts, travel, trauma, syncope. Never seen a cardiologist. No echo, no stress test in the past.

## 2022-07-16 VITALS
OXYGEN SATURATION: 99 % | TEMPERATURE: 98 F | HEART RATE: 75 BPM | DIASTOLIC BLOOD PRESSURE: 78 MMHG | SYSTOLIC BLOOD PRESSURE: 131 MMHG | RESPIRATION RATE: 18 BRPM

## 2022-07-16 PROBLEM — D64.9 ANEMIA, UNSPECIFIED: Chronic | Status: ACTIVE | Noted: 2022-07-12

## 2022-07-16 LAB
BASOPHILS # BLD AUTO: 0.05 K/UL — SIGNIFICANT CHANGE UP (ref 0–0.2)
BASOPHILS NFR BLD AUTO: 0.5 % — SIGNIFICANT CHANGE UP (ref 0–2)
EOSINOPHIL # BLD AUTO: 0.48 K/UL — SIGNIFICANT CHANGE UP (ref 0–0.5)
EOSINOPHIL NFR BLD AUTO: 4.9 % — SIGNIFICANT CHANGE UP (ref 0–6)
IMM GRANULOCYTES NFR BLD AUTO: 0.2 % — SIGNIFICANT CHANGE UP (ref 0–1.5)
LYMPHOCYTES # BLD AUTO: 3.37 K/UL — HIGH (ref 1–3.3)
LYMPHOCYTES # BLD AUTO: 34.5 % — SIGNIFICANT CHANGE UP (ref 13–44)
MONOCYTES # BLD AUTO: 0.91 K/UL — HIGH (ref 0–0.9)
MONOCYTES NFR BLD AUTO: 9.3 % — SIGNIFICANT CHANGE UP (ref 2–14)
NEUTROPHILS # BLD AUTO: 4.94 K/UL — SIGNIFICANT CHANGE UP (ref 1.8–7.4)
NEUTROPHILS NFR BLD AUTO: 50.6 % — SIGNIFICANT CHANGE UP (ref 43–77)
NRBC # BLD: 0 /100 WBCS — SIGNIFICANT CHANGE UP
NRBC # FLD: 0 K/UL — SIGNIFICANT CHANGE UP
SARS-COV-2 RNA SPEC QL NAA+PROBE: SIGNIFICANT CHANGE UP

## 2022-07-27 ENCOUNTER — TRANSCRIPTION ENCOUNTER (OUTPATIENT)
Age: 42
End: 2022-07-27

## 2022-07-27 ENCOUNTER — APPOINTMENT (OUTPATIENT)
Dept: CARDIOLOGY | Facility: HOSPITAL | Age: 42
End: 2022-07-27

## 2022-07-27 NOTE — ASU PATIENT PROFILE, ADULT - FALL HARM RISK - UNIVERSAL INTERVENTIONS
Bed in lowest position, wheels locked, appropriate side rails in place/Call bell, personal items and telephone in reach/Instruct patient to call for assistance before getting out of bed or chair/Non-slip footwear when patient is out of bed/[place] to call system/Physically safe environment - no spills, clutter or unnecessary equipment/Purposeful Proactive Rounding/Room/bathroom lighting operational, light cord in reach

## 2022-07-28 ENCOUNTER — OUTPATIENT (OUTPATIENT)
Dept: OUTPATIENT SERVICES | Facility: HOSPITAL | Age: 42
LOS: 1 days | Discharge: HOME | End: 2022-07-28

## 2022-07-28 ENCOUNTER — TRANSCRIPTION ENCOUNTER (OUTPATIENT)
Age: 42
End: 2022-07-28

## 2022-07-28 ENCOUNTER — RESULT REVIEW (OUTPATIENT)
Age: 42
End: 2022-07-28

## 2022-07-28 VITALS
HEART RATE: 71 BPM | SYSTOLIC BLOOD PRESSURE: 115 MMHG | RESPIRATION RATE: 16 BRPM | DIASTOLIC BLOOD PRESSURE: 61 MMHG | OXYGEN SATURATION: 97 %

## 2022-07-28 VITALS
HEART RATE: 78 BPM | HEIGHT: 62 IN | DIASTOLIC BLOOD PRESSURE: 90 MMHG | WEIGHT: 246.92 LBS | TEMPERATURE: 99 F | OXYGEN SATURATION: 100 % | SYSTOLIC BLOOD PRESSURE: 140 MMHG | RESPIRATION RATE: 16 BRPM

## 2022-07-28 DIAGNOSIS — Z98.891 HISTORY OF UTERINE SCAR FROM PREVIOUS SURGERY: Chronic | ICD-10-CM

## 2022-07-28 DIAGNOSIS — N92.0 EXCESSIVE AND FREQUENT MENSTRUATION WITH REGULAR CYCLE: ICD-10-CM

## 2022-07-28 LAB
GLUCOSE BLDC GLUCOMTR-MCNC: 122 MG/DL — HIGH (ref 70–99)
HCG UR QL: NEGATIVE — SIGNIFICANT CHANGE UP

## 2022-07-28 PROCEDURE — 88305 TISSUE EXAM BY PATHOLOGIST: CPT | Mod: 26

## 2022-07-28 PROCEDURE — 58300 INSERT INTRAUTERINE DEVICE: CPT

## 2022-07-28 PROCEDURE — 58100 BIOPSY OF UTERUS LINING: CPT

## 2022-07-28 DEVICE — IUD MIRENA: Type: IMPLANTABLE DEVICE | Status: FUNCTIONAL

## 2022-07-28 RX ORDER — ACETAMINOPHEN 500 MG
3 TABLET ORAL
Qty: 0 | Refills: 0 | DISCHARGE

## 2022-07-28 RX ORDER — SODIUM CHLORIDE 9 MG/ML
1000 INJECTION, SOLUTION INTRAVENOUS
Refills: 0 | Status: DISCONTINUED | OUTPATIENT
Start: 2022-07-28 | End: 2022-08-11

## 2022-07-28 RX ORDER — IBUPROFEN 200 MG
3 TABLET ORAL
Qty: 0 | Refills: 0 | DISCHARGE

## 2022-07-28 RX ORDER — INSULIN GLARGINE 100 [IU]/ML
30 INJECTION, SOLUTION SUBCUTANEOUS
Qty: 0 | Refills: 0 | DISCHARGE

## 2022-07-28 RX ORDER — FERROUS SULFATE 325(65) MG
1 TABLET ORAL
Qty: 0 | Refills: 0 | DISCHARGE

## 2022-07-28 RX ORDER — METFORMIN HYDROCHLORIDE 850 MG/1
1 TABLET ORAL
Qty: 0 | Refills: 0 | DISCHARGE

## 2022-07-28 NOTE — BRIEF OPERATIVE NOTE - NSICDXBRIEFPOSTOP_GEN_ALL_CORE_FT
POST-OP DIAGNOSIS:  Abnormal uterine bleeding due to leiomyoma of uterus 28-Jul-2022 13:55:43  Jereen, Amyeo

## 2022-07-28 NOTE — BRIEF OPERATIVE NOTE - COMMENTS
Unable to perform hysteroscopy or D&C due to difficulty grabbing on to cervix.  Mirena IUD Lot # [id number]  Exp Oct 2024

## 2022-07-28 NOTE — ASU DISCHARGE PLAN (ADULT/PEDIATRIC) - ASU DC SPECIAL INSTRUCTIONSFT
POSTOPERATIVE INSTRUCTIONS FOR HYSTEROSCOPY, D&C    After your surgery it is normal to experience:    •	Vaginal bleeding- can last 1-2 weeks and should not be heavier than a period. It may come and go and be red, brown or pink. Use pads, not tampons.  •	Cramping- Is common especially within the first 24 hours. This should be relieved by taking over the counter motrin, advil or Tylenol.  •	Watery discharge- can be seen for a day or two after hysteroscopy because some of the fluid that is put into the uterus during surgery may continue to leak out for a day or two.  •	Vaginal soreness/irritation- can occur in the first few days after surgery because of the instruments that were used in the vagina. Soreness can be treated with ice pack and irritation can be taken care of with an emollient such as balmex or aquaphor that you can put directly on the irritated area.    Restrictions: For 10-14 days after the surgery you should avoid the following:    •	Tampons  •	Sex  •	Vigorous gym exercise  •	Swimming  pools and tub baths  •	Wait a day or two before going back to work    Anesthesia Precautions:  For the next 12 hours do not:   •	drive a car,  •	 operate power tools or machinery,  •	drink alcohol, beer, or wine,   •	make important personal or business decisions    Diet:   •	Resume Regular diet but Progress diet slowly     Physician Notification- Warning signs to look out for  •	Heavy Vaginal Bleeding   •	Shortness of breath or chest pain  •	Severe Abdominal Pain  •	Persistent nausea and vomiting  •	Pain not relieved by medications  •	Fever greater than 100.5®F  •	Inability to tolerate liquids or foods  •	Unable to urinate after 8 hours    Discharge and Disposition  •	Discharge to home    Follow Up Care:  •	In the event that you develop a complication and you are unable to reach your own physician, you may contact:  [phone] or go to the nearest Emergency Room.   •	Please contact the office to make a follow up appointment [phone] POSTOPERATIVE INSTRUCTIONS FOR Endometrial Biopsy & Mirena IUD Insertion Under US Guidance     After your surgery it is normal to experience:    •	Vaginal bleeding- can last 1-2 weeks and should not be heavier than a period. It may come and go and be red, brown or pink. Use pads, not tampons.  •	Cramping- Is common especially within the first 24 hours. This should be relieved by taking over the counter Motrin, Advil or Tylenol.  •	Watery discharge- can be seen for a day or two after hysteroscopy because some of the fluid that is put into the uterus during surgery may continue to leak out for a day or two.  •	Vaginal soreness/irritation- can occur in the first few days after surgery because of the instruments that were used in the vagina. Soreness can be treated with ice pack and irritation can be taken care of with an emollient such as Balmex or Aquaphor that you can put directly on the irritated area.    Restrictions: For 10-14 days after the surgery you should avoid the following:    •	Tampons  •	Sex  •	Vigorous gym exercise  •	Swimming  pools and tub baths  •	Wait a day or two before going back to work    Mirena Insertion:  •	Some cramping is expected secondary to IUD insertion.  •	You may feel strings on internal exam, please do not tug or pull. Stings were left long at the time of your surgery, to be adjusted at your post-operative visit.  •	If your IUD is causing severe cramping, if you are having fresh red bleeding, or if you are worried your IUD is undergoing expulsion, please call your OBGYN for further instructions or present to nearest ED or to our [facility] ED.  •	Your Mirena IUD is effective for 5-7 years post insertion. Check in with your OBGYN in early 2027 about continuation of Mirena vs new birth control measures. The following is the LOT number for your Mirena: LOT# [id number]    Anesthesia Precautions:  For the next 12 hours do not:   •	drive a car,  •	 operate power tools or machinery,  •	drink alcohol, beer, or wine,   •	make important personal or business decisions    Diet:   •	Resume Regular diet but Progress diet slowly     Physician Notification- Warning signs to look out for  •	Heavy Vaginal Bleeding   •	Shortness of breath or chest pain  •	Severe Abdominal Pain  •	Persistent nausea and vomiting  •	Pain not relieved by medications  •	Fever greater than 100.5®F  •	Inability to tolerate liquids or foods  •	Unable to urinate after 8 hours    Discharge and Disposition  •	Discharge to home    Follow Up Care:  •	In the event that you develop a complication and you are unable to reach your own physician, you may contact:  [phone] or go to the nearest Emergency Room.   •	Please contact the office to make a follow up appointment [phone] in 4 weeks.

## 2022-07-28 NOTE — BRIEF OPERATIVE NOTE - NSICDXBRIEFPROCEDURE_GEN_ALL_CORE_FT
PROCEDURES:  Insertion of Mirena intrauterine device (IUD) 28-Jul-2022 13:52:57  Jereen, Amyeo  Localization of intrauterine device (IUD) with ultrasound guidance 28-Jul-2022 13:55:09  Jereen, Amyeo

## 2022-07-28 NOTE — ASU DISCHARGE PLAN (ADULT/PEDIATRIC) - NS MD DC FALL RISK RISK
For information on Fall & Injury Prevention, visit: [email or web address] OR  [email or web address] OR  https://www.cdc.gov/steadi/patient.html

## 2022-07-28 NOTE — ASU DISCHARGE PLAN (ADULT/PEDIATRIC) - PROVIDER TOKENS
PROVIDER:[TOKEN:[[id number]],FOLLOWUP:[2 weeks],ESTABLISHEDPATIENT:[T]] PROVIDER:[TOKEN:[[id number]],FOLLOWUP:[1 month],ESTABLISHEDPATIENT:[T]]

## 2022-07-28 NOTE — ASU DISCHARGE PLAN (ADULT/PEDIATRIC) - NURSING INSTRUCTIONS
Last dose of TYLENOL for pain management was at 1pm. Next dose of TYLENOL may be taken at or after 5pm if needed. DO NOT take any additional products containing TYLENOL or ACETAMINOPHEN, such as VICODIN, PERCOCET, NORCO, EXCEDRIN, and any over-the-counter cold medications. DO NOT CONSUME MORE THAN 6977-2911 MG OF TYLENOL (acetaminophen) in a 24-hour period. Last dose of TYLENOL for pain management was at 1pm. Next dose of TYLENOL may be taken at or after 7pm if needed. DO NOT take any additional products containing TYLENOL or ACETAMINOPHEN, such as VICODIN, PERCOCET, NORCO, EXCEDRIN, and any over-the-counter cold medications. DO NOT CONSUME MORE THAN 2463-4052 MG OF TYLENOL (acetaminophen) in a 24-hour period.

## 2022-07-28 NOTE — ASU DISCHARGE PLAN (ADULT/PEDIATRIC) - CARE PROVIDER_API CALL
Shireen George)  Raquel MAYO  [address], [address]  [place] [address]  Phone: [phone]  Fax: [phone]  Established Patient  Follow Up Time: 2 weeks   Shireen George)  Raquel MAYO  [address], [address]  [place] [address]  Phone: [phone]  Fax: [phone]  Established Patient  Follow Up Time: 1 month

## 2022-07-28 NOTE — BRIEF OPERATIVE NOTE - OPERATION/FINDINGS
EUA- Anteverted 18 wk non-gravid fibroid uterus, no adnexal masses, posterior cervix with long vaginal canal, mobile uterus.  US- Mirena IUD Place at Fundus

## 2022-07-28 NOTE — BRIEF OPERATIVE NOTE - NSICDXBRIEFPREOP_GEN_ALL_CORE_FT
PRE-OP DIAGNOSIS:  Abnormal uterine bleeding due to leiomyoma of uterus 28-Jul-2022 13:55:39  Jereen, Amyeo

## 2022-08-03 LAB — SURGICAL PATHOLOGY STUDY: SIGNIFICANT CHANGE UP

## 2022-08-30 ENCOUNTER — APPOINTMENT (OUTPATIENT)
Dept: OBGYN | Facility: CLINIC | Age: 42
End: 2022-08-30

## 2022-08-30 VITALS — WEIGHT: 252 LBS | SYSTOLIC BLOOD PRESSURE: 137 MMHG | DIASTOLIC BLOOD PRESSURE: 84 MMHG

## 2022-08-30 DIAGNOSIS — D25.9 LEIOMYOMA OF UTERUS, UNSPECIFIED: ICD-10-CM

## 2022-08-30 DIAGNOSIS — N92.0 EXCESSIVE AND FREQUENT MENSTRUATION WITH REGULAR CYCLE: ICD-10-CM

## 2022-08-30 DIAGNOSIS — Z30.431 ENCOUNTER FOR ROUTINE CHECKING OF INTRAUTERINE CONTRACEPTIVE DEVICE: ICD-10-CM

## 2022-08-30 PROCEDURE — 99212 OFFICE O/P EST SF 10 MIN: CPT

## 2022-08-31 PROBLEM — Z30.431 IUD CHECK UP: Status: ACTIVE | Noted: 2022-08-31

## 2022-08-31 PROBLEM — D25.9 FIBROID UTERUS: Status: ACTIVE | Noted: 2022-06-22

## 2022-08-31 PROBLEM — N92.0 HEAVY PERIODS: Status: ACTIVE | Noted: 2022-06-08

## 2022-08-31 NOTE — PLAN
[FreeTextEntry1] : 41 y/o F presenting for post operative check\par -Patient recovering well\par -She is cleared to resume normal activity including sexual intercourse and exercise\par -Mirena IUD in place, better visualized on abdominal sonogram\par -Discussed irregular bleeding pattern while using Mirena, especially in first 3-6 months\par

## 2022-08-31 NOTE — HISTORY OF PRESENT ILLNESS
[FreeTextEntry1] : Patient is a 43 y/o F s/p EMB, Mirena IUD insertion in the OR on 7/28 for AUB-L. Patient is feeling well today. Pain well controlled, ambulating, voiding, tolerating PO. Denies subjective fevers and chills. Reports that she is currently on her menses and it is significantly lighter than usual. She did have spotting daily since her procedure. \par Pathology Reviewed: Secretory endometrium\par

## 2022-09-27 NOTE — ED PROVIDER NOTE - NS_EDPROVIDERDISPOUSERTYPE_ED_A_ED
Personalized Preventive Plan for Mena Schlatter - TOOVIA Data on Aging. Call 1-679.849.5939 or search [employer] online. You need 1798-5020 mg of calcium and 5627-2224 IU of vitamin D per day. It is possible to meet your calcium requirement with diet alone, but a vitamin D supplement is usually necessary to meet this goal.  When exposed to the sun, use a sunscreen that protects against both UVA and UVB radiation with an SPF of 30 or greater. Reapply every 2 to 3 hours or after sweating, drying off with a towel, or swimming. Always wear a seat belt when traveling in a car. Always wear a helmet when riding a bicycle or motorcycle. Attending Attestation (For Attendings USE Only)...

## 2023-09-18 NOTE — ED PROVIDER NOTE - PROGRESS NOTE DETAILS
present at bedside Patient offered CDU stay given risk of ACS and expedited eval with echo and possible stress. Labs wnl at this time. NSR on EKG. CXR clear. Trop and other labs wnl. Patient would like to go home with close cardiology follow-up.

## 2023-12-21 ENCOUNTER — APPOINTMENT (OUTPATIENT)
Dept: ANTEPARTUM | Facility: CLINIC | Age: 43
End: 2023-12-21

## 2023-12-21 ENCOUNTER — APPOINTMENT (OUTPATIENT)
Dept: OBGYN | Facility: CLINIC | Age: 43
End: 2023-12-21

## 2024-07-15 ENCOUNTER — EMERGENCY (EMERGENCY)
Facility: HOSPITAL | Age: 44
LOS: 1 days | Discharge: HOME | End: 2024-07-15
Attending: EMERGENCY MEDICINE | Admitting: EMERGENCY MEDICINE
Payer: MEDICAID

## 2024-07-15 VITALS
TEMPERATURE: 98 F | WEIGHT: 261.91 LBS | RESPIRATION RATE: 16 BRPM | HEART RATE: 97 BPM | SYSTOLIC BLOOD PRESSURE: 119 MMHG | DIASTOLIC BLOOD PRESSURE: 79 MMHG | OXYGEN SATURATION: 100 %

## 2024-07-15 VITALS
DIASTOLIC BLOOD PRESSURE: 89 MMHG | HEART RATE: 79 BPM | RESPIRATION RATE: 16 BRPM | TEMPERATURE: 98 F | SYSTOLIC BLOOD PRESSURE: 137 MMHG | OXYGEN SATURATION: 100 %

## 2024-07-15 DIAGNOSIS — Z90.710 ACQUIRED ABSENCE OF BOTH CERVIX AND UTERUS: Chronic | ICD-10-CM

## 2024-07-15 DIAGNOSIS — Z98.891 HISTORY OF UTERINE SCAR FROM PREVIOUS SURGERY: Chronic | ICD-10-CM

## 2024-07-15 PROCEDURE — 99284 EMERGENCY DEPT VISIT MOD MDM: CPT

## 2024-07-15 PROCEDURE — 73564 X-RAY EXAM KNEE 4 OR MORE: CPT | Mod: 26,50

## 2024-07-15 RX ORDER — ACETAMINOPHEN 325 MG
975 TABLET ORAL ONCE
Refills: 0 | Status: COMPLETED | OUTPATIENT
Start: 2024-07-15 | End: 2024-07-15

## 2024-07-15 RX ORDER — OXYCODONE HYDROCHLORIDE 100 MG/5ML
5 SOLUTION ORAL ONCE
Refills: 0 | Status: DISCONTINUED | OUTPATIENT
Start: 2024-07-15 | End: 2024-07-15

## 2024-07-15 RX ADMIN — Medication 975 MILLIGRAM(S): at 07:30

## (undated) DEVICE — DRSG TELFA 3 X 8

## (undated) DEVICE — LABELS BLANK W PEN

## (undated) DEVICE — TUBING SUCTION NONCONDUCTIVE 6MM X 12FT

## (undated) DEVICE — SOL IRR POUR H2O 500ML

## (undated) DEVICE — DRAPE IRRIGATION POUCH 19X23"

## (undated) DEVICE — VENODYNE/SCD SLEEVE CALF MEDIUM

## (undated) DEVICE — PACK PERI GYN

## (undated) DEVICE — PREP BETADINE SPONGE STICKS

## (undated) DEVICE — PRESSURE INFUSOR BAG 1000ML

## (undated) DEVICE — VISITEC 4X4

## (undated) DEVICE — SOL IRR POUR NS 0.9% 1000ML

## (undated) DEVICE — TUBING IRR SET FOR CYSTOSCOPY 77"

## (undated) DEVICE — GOWN LG

## (undated) DEVICE — CURETTE ENDOMETRIAL SUCTION 3.1X23.5CM

## (undated) DEVICE — DRAPE TOWEL BLUE 17" X 24"

## (undated) DEVICE — POSITIONER STRAP ARMBOARD VELCRO TS-30

## (undated) DEVICE — DRSG PAD SANITARY OB

## (undated) DEVICE — WARMING BLANKET FULL ADULT

## (undated) DEVICE — DRAPE LIGHT HANDLE COVER (GREEN)

## (undated) DEVICE — BASIN SET DOUBLE